# Patient Record
Sex: FEMALE | Race: WHITE | NOT HISPANIC OR LATINO | Employment: STUDENT | URBAN - METROPOLITAN AREA
[De-identification: names, ages, dates, MRNs, and addresses within clinical notes are randomized per-mention and may not be internally consistent; named-entity substitution may affect disease eponyms.]

---
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
## 2024-01-05 ENCOUNTER — PATIENT MESSAGE (OUTPATIENT)
Dept: OBSTETRICS AND GYNECOLOGY | Facility: CLINIC | Age: 19
End: 2024-01-05
Payer: OTHER GOVERNMENT

## 2024-01-05 NOTE — TELEPHONE ENCOUNTER
----- Message from Sheri Dorado sent at 1/5/2024  1:23 PM CST -----  Regarding: [id number]  Type: Patient Call Back    Who called:self    What is the request in detail:Pt states she will like to come in and consult for the Nexplanon birth control, will like a callback from the nurse to be scheduled.    Can the clinic reply by [facility]?no    Would the patient rather a call back or a response via [facility]? Callback     Best call back number: [phone]         Additional Information: pt is trying to get in as soon as possible

## 2024-01-12 ENCOUNTER — OFFICE VISIT (OUTPATIENT)
Dept: OBSTETRICS AND GYNECOLOGY | Facility: CLINIC | Age: 19
End: 2024-01-12
Payer: OTHER GOVERNMENT

## 2024-01-12 VITALS
DIASTOLIC BLOOD PRESSURE: 72 MMHG | BODY MASS INDEX: 25.68 KG/M2 | HEIGHT: 66 IN | SYSTOLIC BLOOD PRESSURE: 102 MMHG | WEIGHT: 159.81 LBS

## 2024-01-12 DIAGNOSIS — Z30.09 BIRTH CONTROL COUNSELING: Primary | ICD-10-CM

## 2024-01-12 PROCEDURE — 99213 OFFICE O/P EST LOW 20 MIN: CPT | Mod: PBBFAC,PN | Performed by: NURSE PRACTITIONER

## 2024-01-12 PROCEDURE — 99999 PR PBB SHADOW E&M-EST. PATIENT-LVL III: CPT | Mod: PBBFAC,,, | Performed by: NURSE PRACTITIONER

## 2024-01-12 PROCEDURE — 99203 OFFICE O/P NEW LOW 30 MIN: CPT | Mod: S$PBB,,, | Performed by: NURSE PRACTITIONER

## 2024-01-12 RX ORDER — VENLAFAXINE HYDROCHLORIDE 37.5 MG/1
37.5 CAPSULE, EXTENDED RELEASE ORAL
COMMUNITY
Start: 2024-01-10

## 2024-01-12 RX ORDER — MEDROXYPROGESTERONE ACETATE 150 MG/ML
150 INJECTION, SUSPENSION INTRAMUSCULAR
COMMUNITY
Start: 2023-10-30 | End: 2024-01-26

## 2024-01-12 RX ORDER — MIRTAZAPINE 15 MG/1
1 TABLET, FILM COATED ORAL NIGHTLY
COMMUNITY
Start: 2023-12-20

## 2024-01-12 NOTE — PROGRESS NOTES
CC: contraception     HPI: Pt is a 18 y.o.  female who presents to discuss change in contraception. She is currently using Depo Provera. She has used Nexplanon in the past and did well on it.   Last Depo in 10/23.  She would like to have the Nexplanon prior to her window for needing next injection. She prefers to avoid method that would increase risk of blood clots.   She has a itching rash to her left shoulder - appeared after recent covid infection.     ROS:  GENERAL: Feeling well overall. Denies fever or chills.   SKIN: Denies rash or lesions.   HEAD: Denies head injury or headache.   NODES: Denies enlarged lymph nodes.   CHEST: Denies chest pain or shortness of breath.   CARDIOVASCULAR: Denies palpitations or left sided chest pain.   ABDOMEN: No abdominal pain, constipation, diarrhea, nausea, vomiting or rectal bleeding.   URINARY: No dysuria, hematuria, or burning on urination.  REPRODUCTIVE: See HPI.   BREASTS: Denies pain, lumps, or nipple discharge.   HEMATOLOGIC: No easy bruisability or excessive bleeding.   MUSCULOSKELETAL: Denies joint pain or swelling.   NEUROLOGIC: Denies syncope or weakness.   PSYCHIATRIC: Denies depression, anxiety or mood swings.    PE:   APPEARANCE: Well nourished, well developed, White female in no acute distress.  PEVIS: Deferred       Diagnosis:  1. Birth control counseling        Plan:   Patient was counseled today on Nexplanon  Discussed risks/ benefits including potential for irregular bleeding pattern   Discussed otc hydrocortisone to rash if no resolution follow with derm for eval   Benefits investigation initiated for Nexplanon       Orders Placed This Encounter    Device Authorization Order    Device Authorization Order         Follow-up  for insertion or as needed     YOVANY Mena

## 2024-01-15 ENCOUNTER — OFFICE VISIT (OUTPATIENT)
Dept: URGENT CARE | Facility: CLINIC | Age: 19
End: 2024-01-15
Payer: OTHER GOVERNMENT

## 2024-01-15 VITALS
WEIGHT: 159 LBS | HEIGHT: 66 IN | BODY MASS INDEX: 25.55 KG/M2 | OXYGEN SATURATION: 96 % | DIASTOLIC BLOOD PRESSURE: 81 MMHG | SYSTOLIC BLOOD PRESSURE: 117 MMHG | RESPIRATION RATE: 18 BRPM | TEMPERATURE: 99 F | HEART RATE: 108 BPM

## 2024-01-15 DIAGNOSIS — N76.0 BACTERIAL VAGINOSIS: ICD-10-CM

## 2024-01-15 DIAGNOSIS — L29.9 PRURITUS: ICD-10-CM

## 2024-01-15 DIAGNOSIS — B96.89 BACTERIAL VAGINOSIS: ICD-10-CM

## 2024-01-15 DIAGNOSIS — R21 RASH AND NONSPECIFIC SKIN ERUPTION: ICD-10-CM

## 2024-01-15 DIAGNOSIS — R35.0 URINARY FREQUENCY: ICD-10-CM

## 2024-01-15 DIAGNOSIS — Z72.51 UNPROTECTED SEXUAL INTERCOURSE: Primary | ICD-10-CM

## 2024-01-15 DIAGNOSIS — B37.31 VAGINAL YEAST INFECTION: ICD-10-CM

## 2024-01-15 LAB
B-HCG UR QL: NEGATIVE
BILIRUB UR QL STRIP: NEGATIVE
CTP QC/QA: YES
GLUCOSE UR QL STRIP: NEGATIVE
HBV SURFACE AG SERPL QL IA: NORMAL
HCV AB SERPL QL IA: NORMAL
HIV 1+2 AB+HIV1 P24 AG SERPL QL IA: NORMAL
KETONES UR QL STRIP: NEGATIVE
LEUKOCYTE ESTERASE UR QL STRIP: NEGATIVE
PH, POC UA: 8 (ref 5–8)
POC BLOOD, URINE: POSITIVE
POC NITRATES, URINE: NEGATIVE
PROT UR QL STRIP: NEGATIVE
SP GR UR STRIP: 1.01 (ref 1–1.03)
UROBILINOGEN UR STRIP-ACNC: NORMAL (ref 0.1–1.1)

## 2024-01-15 PROCEDURE — 86592 SYPHILIS TEST NON-TREP QUAL: CPT

## 2024-01-15 PROCEDURE — 87491 CHLMYD TRACH DNA AMP PROBE: CPT

## 2024-01-15 PROCEDURE — 87389 HIV-1 AG W/HIV-1&-2 AB AG IA: CPT

## 2024-01-15 PROCEDURE — 81003 URINALYSIS AUTO W/O SCOPE: CPT | Mod: QW,S$GLB,,

## 2024-01-15 PROCEDURE — 86803 HEPATITIS C AB TEST: CPT

## 2024-01-15 PROCEDURE — 81514 NFCT DS BV&VAGINITIS DNA ALG: CPT

## 2024-01-15 PROCEDURE — 81025 URINE PREGNANCY TEST: CPT | Mod: S$GLB,,,

## 2024-01-15 PROCEDURE — 87340 HEPATITIS B SURFACE AG IA: CPT

## 2024-01-15 PROCEDURE — 99214 OFFICE O/P EST MOD 30 MIN: CPT | Mod: S$GLB,,,

## 2024-01-15 PROCEDURE — 36415 COLL VENOUS BLD VENIPUNCTURE: CPT

## 2024-01-15 RX ORDER — TRIAMCINOLONE ACETONIDE 1 MG/G
CREAM TOPICAL 2 TIMES DAILY
Qty: 15 G | Refills: 1 | Status: SHIPPED | OUTPATIENT
Start: 2024-01-15

## 2024-01-15 RX ORDER — PREDNISONE 20 MG/1
20 TABLET ORAL DAILY
Qty: 4 TABLET | Refills: 0 | Status: SHIPPED | OUTPATIENT
Start: 2024-01-15 | End: 2024-01-26

## 2024-01-15 NOTE — PROGRESS NOTES
"Subjective:      Patient ID: Alia Del Rosario is a 18 y.o. female.    Vitals:  height is 5' 6" (1.676 m) and weight is 72.1 kg (159 lb). Her oral temperature is 98.7 °F (37.1 °C). Her blood pressure is 117/81 and her pulse is 108. Her respiration is 18 and oxygen saturation is 96%.     Chief Complaint: Exposure to STD    This is a 18 y.o. female who presents today with a chief complaint of std testing.     Provider note:    19 yo F c/o UPI 3 weeks ago with new partner as well as an itchy rash that appeared following the incident. The rash is located to her neck and left arm, she has applied cortisone cream otc. Denies vaginal discharge, vaginal sores, itchiness, irritation. States she has been urinating more and having urgency. Denies dysuria, fever, nvd, flank pain. She uses Depo for birth control, denies being late for injection. Does not have menstrual cycles, states she spots. SHe presents with her mom today    Exposure to STD   The patient's primary symptoms include a genital rash. The patient's pertinent negatives include no dysuria or pelvic pain. This is a new problem. The current episode started 1 to 4 weeks ago. The vaginal discharge was normal. Associate symptoms include urinary frequency. She has tried nothing for the symptoms.       Genitourinary:  Positive for frequency and urgency. Negative for dysuria, urine decreased, flank pain, bladder incontinence, hematuria, history of kidney stones, irregular menstruation, missed menses, heavy menstrual bleeding, vaginal pain, vaginal discharge, vaginal bleeding, vaginal odor, genital sore and pelvic pain.   Skin:  Positive for rash and erythema.      Objective:     Physical Exam   Constitutional: normal  HENT:   Head: Normocephalic and atraumatic.   Eyes: Conjunctivae are normal. Pupils are equal, round, and reactive to light. Extraocular movement intact   Abdominal: Normal appearance.   Musculoskeletal: Normal range of motion.         General: Normal range of " motion.   Neurological: She is alert.   Skin: Skin is warm and dry. erythema         Comments: Erythematous Maculopapular rash noted to neck and left arm        Assessment:     1. Unprotected sexual intercourse    2. Urinary frequency    3. Rash and nonspecific skin eruption    4. Pruritus        Plan:   Discussed preventatively starting Gc/CT treatment today, mom and pt declined. Zyrtec and pepcid daily until symptoms resolve. Will follow up with STI testing. Avoid intercourse until labs result.     Unprotected sexual intercourse    Urinary frequency  -     POCT Urinalysis, Dipstick, Automated, W/O Scope  -     POCT urine pregnancy  -     C. trachomatis/N. gonorrhoeae by AMP DNA  -     Vaginosis Screen by DNA Probe  -     RPR  -     HIV 1/2 Ag/Ab (4th Gen)  -     Hepatitis C Antibody  -     Hepatitis B Surface Antigen    Rash and nonspecific skin eruption  -     predniSONE (DELTASONE) 20 MG tablet; Take 1 tablet (20 mg total) by mouth once daily. for 4 days  Dispense: 4 tablet; Refill: 0  -     triamcinolone acetonide 0.1% (KENALOG) 0.1 % cream; Apply topically 2 (two) times daily.  Dispense: 15 g; Refill: 1    Pruritus  -     predniSONE (DELTASONE) 20 MG tablet; Take 1 tablet (20 mg total) by mouth once daily. for 4 days  Dispense: 4 tablet; Refill: 0  -     triamcinolone acetonide 0.1% (KENALOG) 0.1 % cream; Apply topically 2 (two) times daily.  Dispense: 15 g; Refill: 1      Results for orders placed or performed in visit on 01/15/24   POCT Urinalysis, Dipstick, Automated, W/O Scope   Result Value Ref Range    POC Blood, Urine Positive (A) Negative, Positive Slide, Positive Tube    POC Bilirubin, Urine Negative Negative, Positive Slide, Positive Tube    POC Urobilinogen, Urine normal 0.1 - 1.1    POC Ketones, Urine Negative Negative, Positive Slide, Positive Tube    POC Protein, Urine Negative Negative, Positive Slide, Positive Tube    POC Nitrates, Urine Negative Negative, Positive Slide, Positive Tube    POC  Glucose, Urine Negative Negative, Positive Slide, Positive Tube    pH, UA 8.0 5 - 8    POC Specific Gravity, Urine 1.010 1.003 - 1.029    POC Leukocytes, Urine Negative Negative, Positive Slide, Positive Tube   POCT urine pregnancy   Result Value Ref Range    POC Preg Test, Ur Negative Negative     Acceptable Yes             Increase condom use to prevent further occurance.  Notify sexual partners of the need for testing.  Complete ALL medication prescribed.  NO sexual intercourse for 7 days after treatment. Retest to ensure infection has cleared-there are infections that require more agressive treatment.  Retest for all ini 6 weeks and again in 6 monts to ensure true negative results.  Today's testing will give no crediable information if you have unprotected sexual activities going forward. Syphillis cases are rising!  Gonorrhea has RESISTANT strains which is why repeat testing after treatment is important.

## 2024-01-15 NOTE — PATIENT INSTRUCTIONS
Zyrtec and pepcid daily until symptoms resolve. Will follow up with STI testing. Avoid intercourse until labs result.

## 2024-01-16 LAB
BACTERIAL VAGINOSIS DNA: POSITIVE
CANDIDA GLABRATA DNA: NEGATIVE
CANDIDA KRUSEI DNA: NEGATIVE
CANDIDA RRNA VAG QL PROBE: POSITIVE
RPR SER QL: NORMAL
T VAGINALIS RRNA GENITAL QL PROBE: NEGATIVE

## 2024-01-17 ENCOUNTER — TELEPHONE (OUTPATIENT)
Dept: URGENT CARE | Facility: CLINIC | Age: 19
End: 2024-01-17
Payer: OTHER GOVERNMENT

## 2024-01-17 LAB
C TRACH DNA SPEC QL NAA+PROBE: NOT DETECTED
N GONORRHOEA DNA SPEC QL NAA+PROBE: NOT DETECTED

## 2024-01-17 RX ORDER — METRONIDAZOLE 500 MG/1
500 TABLET ORAL EVERY 12 HOURS
Qty: 14 TABLET | Refills: 0 | Status: SHIPPED | OUTPATIENT
Start: 2024-01-17 | End: 2024-01-26

## 2024-01-17 RX ORDER — FLUCONAZOLE 150 MG/1
150 TABLET ORAL DAILY
Qty: 1 TABLET | Refills: 0 | Status: SHIPPED | OUTPATIENT
Start: 2024-01-17 | End: 2024-01-26

## 2024-01-17 NOTE — TELEPHONE ENCOUNTER
10:00 AM: Spoke with patient on the phone and discussed results of recent testing. Will send in Diflucan for yeast infection and flagyl for bacterial vaginosis. Flagyl and alcohol precautions discussed. Answered all questions.

## 2024-01-21 ENCOUNTER — TELEPHONE (OUTPATIENT)
Dept: URGENT CARE | Facility: CLINIC | Age: 19
End: 2024-01-21
Payer: OTHER GOVERNMENT

## 2024-01-21 ENCOUNTER — PATIENT MESSAGE (OUTPATIENT)
Dept: URGENT CARE | Facility: CLINIC | Age: 19
End: 2024-01-21
Payer: OTHER GOVERNMENT

## 2024-01-21 NOTE — TELEPHONE ENCOUNTER
----- Message from Raymundo Silva MA sent at 1/19/2024  9:34 AM CST -----  Contact: [phone]    ----- Message -----  From: Sherlyn Rojas  Sent: 1/19/2024   9:33 AM CST  To: #    Patient called for medical advice,  mom states pt is having Rx allergy reaction . requesting a call back.    Thank you

## 2024-01-21 NOTE — TELEPHONE ENCOUNTER
Message sent through portal for pt to call us or make appt.  If pt is short of breath or having swelling in face

## 2024-01-21 NOTE — TELEPHONE ENCOUNTER
----- Message from Raymundo Silva MA sent at 1/19/2024  9:34 AM CST -----  Contact: [phone]    ----- Message -----  From: Sherlyn Rojas  Sent: 1/19/2024   9:33 AM CST  To: #    Patient called for medical advice,  mom states pt is having Rx allergy reaction . requesting a call back.    Thank you

## 2024-01-21 NOTE — TELEPHONE ENCOUNTER
----- Message from Raymundo Silva MA sent at 1/19/2024  9:34 AM CST -----  Contact: [phone]    ----- Message -----  From: Sherlyn Rojas  Sent: 1/19/2024   9:33 AM CST  To: #    Patient called for medical advice,  mom states pt is having Rx allergy reaction . requesting a call back.    Thank you

## 2024-01-26 ENCOUNTER — PROCEDURE VISIT (OUTPATIENT)
Dept: OBSTETRICS AND GYNECOLOGY | Facility: CLINIC | Age: 19
End: 2024-01-26
Payer: OTHER GOVERNMENT

## 2024-01-26 VITALS
BODY MASS INDEX: 26.19 KG/M2 | WEIGHT: 162.94 LBS | DIASTOLIC BLOOD PRESSURE: 72 MMHG | HEIGHT: 66 IN | SYSTOLIC BLOOD PRESSURE: 110 MMHG

## 2024-01-26 DIAGNOSIS — Z30.017 NEXPLANON INSERTION: Primary | ICD-10-CM

## 2024-01-26 PROCEDURE — 11981 INSERTION DRUG DLVR IMPLANT: CPT | Mod: PBBFAC,PN | Performed by: NURSE PRACTITIONER

## 2024-01-26 PROCEDURE — 99499 UNLISTED E&M SERVICE: CPT | Mod: 25,S$PBB,, | Performed by: NURSE PRACTITIONER

## 2024-01-26 PROCEDURE — 11981 INSERTION DRUG DLVR IMPLANT: CPT | Mod: S$PBB,,, | Performed by: NURSE PRACTITIONER

## 2024-01-26 PROCEDURE — 99999PBSHW PR PBB SHADOW TECHNICAL ONLY FILED TO HB: Mod: PBBFAC,,,

## 2024-01-26 RX ADMIN — ETONOGESTREL 68 MG: 68 IMPLANT SUBCUTANEOUS at 12:01

## 2024-01-26 NOTE — PROCEDURES
CC: NEXPLANON INSERTION      PRE-NEXPLANON INSERTION COUNSELING:  All contraceptive options were reviewed and the patient chooses Nexplanon.  Patients history was reviewed and there were no contraindications to Nexplanon.  The procedure and minimal risks of pain, bleeding, bruising and infection at the insertion site discussed. Possible irregular menstrual bleeding pattern versus amenorrhea was explained.  No protection against STDs discussed.  Written information provided; all questions answered and patient agrees to proceed.  Consent signed and scanned into computer.  NEXPLANON LOT #[id number],  EXPIRATION 04/01/2025    There were no vitals filed for this visit.    EXAM:  With patient in supine position the nondominant arm was flexed at the elbow and externally rotated.  The insertion site was identified 6-8 cm above the elbow crease at the inner side of the upper arm overlying the groove between biceps and triceps.  The insertion site was marked and a second kate was placed 6-8 cm above the first.    PROCEDURE:  TIME OUT PERFORMED.  The insertion site was prepped with antiseptic and injected with 2 cc of 1% Xylocaine without epinephrine subq along the planned insertion canal.  Using sterile technique the Nexplanon applicator was visually verified and removed from the blister pack.  The Transparent Protection Cap was removed by sliding it horizontally in the direction of the arrow away from the needle.  The white colored implant was visualized by looking into the tip of the needle.   The needle tip was inserted bevel side up at a 30 degree angle to penetrate the skin.  The applicator was lowered parallel to the arm and the skin was tented with the needle.  While lifting skin with the needle, the needle was inserted to its full length.  Keeping the applicator in place, the purple slider was unlocked by pushing it slightly down.  The slider was then moved fully back until it stopped.  The applicator was then removed.  The Needle was noted to be fully retracted and only the purple tip of the obturator was visible.  The implant was palpable beneath the skin after insertion.  A small adhesive bandage and then a pressure bandage was placed over the insertion site.  The patient tolerated the procedure well.    ASSESSMENT:  1. Contraception management / Nexplanon insertion.V25.0.    POST NEXPLANON INSERTION COUNSELING:  Manage post nexplanon placement arm pain with NSAIDs, Tylenol or Rx per [employer].  Keep arm elevated and apply intermittent ice packs to decrease pain and bruising for 24 Hours.  May remove bandage in 24 hours.  Nexplanon danger signs (worsening pain at insertion site, bleeding through bandage, redness and/or pus drainage at insertion site).  Removal in 3 years.    Counseling lasted approximately 15 minutes and all her questions were answered.    FOLLOW-UP: with me in two weeks.

## 2024-02-08 ENCOUNTER — OFFICE VISIT (OUTPATIENT)
Dept: URGENT CARE | Facility: CLINIC | Age: 19
End: 2024-02-08
Payer: OTHER GOVERNMENT

## 2024-02-08 VITALS
SYSTOLIC BLOOD PRESSURE: 106 MMHG | HEIGHT: 66 IN | HEART RATE: 70 BPM | WEIGHT: 162 LBS | TEMPERATURE: 97 F | RESPIRATION RATE: 15 BRPM | OXYGEN SATURATION: 99 % | BODY MASS INDEX: 26.03 KG/M2 | DIASTOLIC BLOOD PRESSURE: 72 MMHG

## 2024-02-08 DIAGNOSIS — J01.90 ACUTE BACTERIAL RHINOSINUSITIS: Primary | ICD-10-CM

## 2024-02-08 DIAGNOSIS — B96.89 ACUTE BACTERIAL RHINOSINUSITIS: Primary | ICD-10-CM

## 2024-02-08 DIAGNOSIS — R09.81 SINUS CONGESTION: ICD-10-CM

## 2024-02-08 DIAGNOSIS — R05.1 ACUTE COUGH: ICD-10-CM

## 2024-02-08 LAB
CTP QC/QA: YES
CTP QC/QA: YES
POC MOLECULAR INFLUENZA A AGN: NEGATIVE
POC MOLECULAR INFLUENZA B AGN: NEGATIVE
SARS-COV-2 AG RESP QL IA.RAPID: NEGATIVE

## 2024-02-08 PROCEDURE — 87502 INFLUENZA DNA AMP PROBE: CPT | Mod: QW,S$GLB,,

## 2024-02-08 PROCEDURE — 94640 AIRWAY INHALATION TREATMENT: CPT | Mod: S$GLB,,,

## 2024-02-08 PROCEDURE — 87811 SARS-COV-2 COVID19 W/OPTIC: CPT | Mod: QW,S$GLB,,

## 2024-02-08 PROCEDURE — 99213 OFFICE O/P EST LOW 20 MIN: CPT | Mod: 25,S$GLB,,

## 2024-02-08 RX ORDER — IPRATROPIUM BROMIDE 0.5 MG/2.5ML
0.5 SOLUTION RESPIRATORY (INHALATION)
Status: COMPLETED | OUTPATIENT
Start: 2024-02-08 | End: 2024-02-08

## 2024-02-08 RX ORDER — ALBUTEROL SULFATE 0.83 MG/ML
2.5 SOLUTION RESPIRATORY (INHALATION)
Status: COMPLETED | OUTPATIENT
Start: 2024-02-08 | End: 2024-02-08

## 2024-02-08 RX ORDER — ESCITALOPRAM OXALATE 5 MG/1
5 TABLET ORAL
COMMUNITY
Start: 2024-01-27 | End: 2024-06-07 | Stop reason: ALTCHOICE

## 2024-02-08 RX ORDER — AMOXICILLIN AND CLAVULANATE POTASSIUM 875; 125 MG/1; MG/1
1 TABLET, FILM COATED ORAL EVERY 12 HOURS
Qty: 10 TABLET | Refills: 0 | Status: SHIPPED | OUTPATIENT
Start: 2024-02-08 | End: 2024-02-13

## 2024-02-08 RX ORDER — ALBUTEROL SULFATE 90 UG/1
2 AEROSOL, METERED RESPIRATORY (INHALATION) EVERY 6 HOURS PRN
Qty: 18 G | Refills: 0 | Status: SHIPPED | OUTPATIENT
Start: 2024-02-08 | End: 2024-02-08 | Stop reason: RX

## 2024-02-08 RX ORDER — ALBUTEROL SULFATE 90 UG/1
2 AEROSOL, METERED RESPIRATORY (INHALATION) EVERY 6 HOURS PRN
Qty: 18 G | Refills: 0 | Status: SHIPPED | OUTPATIENT
Start: 2024-02-08

## 2024-02-08 RX ADMIN — IPRATROPIUM BROMIDE 0.5 MG: 0.5 SOLUTION RESPIRATORY (INHALATION) at 05:02

## 2024-02-08 RX ADMIN — ALBUTEROL SULFATE 2.5 MG: 0.83 SOLUTION RESPIRATORY (INHALATION) at 04:02

## 2024-02-08 NOTE — PROGRESS NOTES
Subjective:      Patient ID: Alia Del Rosario is a 18 y.o. female.    Vitals:  vitals were not taken for this visit.     Chief Complaint: No chief complaint on file.    HPI  ROS   Objective:     Physical Exam    Assessment:     No diagnosis found.    Plan:       There are no diagnoses linked to this encounter.

## 2024-02-08 NOTE — PROGRESS NOTES
"Subjective:      Patient ID: Alia Del Rosario is a 18 y.o. female.    Vitals:  height is 5' 6" (1.676 m) and weight is 73.5 kg (162 lb). Her oral temperature is 97.4 °F (36.3 °C). Her blood pressure is 106/72 and her pulse is 70. Her respiration is 15 and oxygen saturation is 99%.     Chief Complaint: No chief complaint on file.    18-year-old female patient presents with green productive cough for over a week, with postnasal drip, sinus pain, sinus pressure, chest congestion for over a week.  Patient states she is been taking Mucinex and her albuterol inhaler daily with no relief. Patient denies fever, SOB, CP, GI complaints or any other associated symptoms.      Cough  This is a new problem. The current episode started in the past 7 days (02/01/2024). Associated symptoms include nasal congestion, postnasal drip and rhinorrhea. Pertinent negatives include no chest pain, chills, ear pain, eye redness, fever, headaches, hemoptysis, rash, sore throat, shortness of breath, sweats, weight loss or wheezing. Treatments tried: dayquil. The treatment provided mild relief. There is no history of environmental allergies.       Constitution: Negative for activity change, appetite change, chills, sweating, fatigue and fever.   HENT:  Positive for postnasal drip, sinus pain and sinus pressure. Negative for ear pain, ear discharge, foreign body in ear, tinnitus, hearing loss, dental problem, congestion, nosebleeds, foreign body in nose and sore throat.    Neck: Negative for neck pain, neck stiffness, painful lymph nodes and neck swelling.   Cardiovascular:  Negative for chest trauma, chest pain, leg swelling and palpitations.   Eyes:  Negative for eye trauma, foreign body in eye, eye discharge, eye itching, eye pain and eye redness.   Respiratory:  Positive for cough and sputum production. Negative for sleep apnea, chest tightness, bloody sputum, COPD, shortness of breath, stridor, wheezing and asthma.    Gastrointestinal:  " Negative for abdominal trauma, abdominal pain, abdominal bloating and history of abdominal surgery.   Endocrine: hair loss, cold intolerance and heat intolerance.   Genitourinary:  Negative for dysuria, frequency, urgency, urine decreased, flank pain, bladder incontinence, bed wetting and hematuria.   Musculoskeletal:  Negative for pain, trauma, joint pain, joint swelling and abnormal ROM of joint.   Skin:  Negative for color change, pale, rash, wound, abrasion, laceration, lesion and hives.   Allergic/Immunologic: Positive for recurrent sinus infections. Negative for environmental allergies, seasonal allergies, food allergies, eczema, asthma, immunocompromised state, chronic cough, hives, itching, sneezing and immunizations up-to-date.   Neurological:  Negative for dizziness, history of vertigo, light-headedness, passing out, headaches, disorientation and altered mental status.   Hematologic/Lymphatic: Negative for swollen lymph nodes, easy bruising/bleeding, trouble clotting and history of blood clots. Does not bruise/bleed easily.   Psychiatric/Behavioral:  Negative for altered mental status, disorientation, confusion, agitation and nervous/anxious. The patient is not nervous/anxious.       Objective:     Physical Exam   Constitutional: She is oriented to person, place, and time. She appears well-developed. She is cooperative.  Non-toxic appearance. She does not appear ill. No distress.   HENT:   Head: Normocephalic and atraumatic.   Ears:   Right Ear: Hearing, tympanic membrane, external ear and ear canal normal.   Left Ear: Hearing, tympanic membrane, external ear and ear canal normal.   Nose: Congestion present. No mucosal edema, rhinorrhea or nasal deformity. No epistaxis. Right sinus exhibits no maxillary sinus tenderness and no frontal sinus tenderness. Left sinus exhibits no maxillary sinus tenderness and no frontal sinus tenderness.   Mouth/Throat: Uvula is midline, oropharynx is clear and moist and  mucous membranes are normal. No trismus in the jaw. Normal dentition. No uvula swelling. No oropharyngeal exudate, posterior oropharyngeal edema or posterior oropharyngeal erythema.   Eyes: Conjunctivae and lids are normal. No scleral icterus.   Neck: Trachea normal and phonation normal. Neck supple. No edema present. No erythema present. No neck rigidity present.   Cardiovascular: Normal rate, regular rhythm, normal heart sounds and normal pulses.   Pulmonary/Chest: Effort normal. No stridor. No respiratory distress. She has no decreased breath sounds. She has no wheezes. She has rhonchi in the right upper field and the left upper field. She has no rales. She exhibits no tenderness.   Abdominal: Normal appearance and bowel sounds are normal. She exhibits no distension and no mass. Soft. There is no abdominal tenderness. There is no rebound, no guarding, no left CVA tenderness and no right CVA tenderness. No hernia.   Musculoskeletal: Normal range of motion.         General: No deformity. Normal range of motion.   Neurological: She is alert and oriented to person, place, and time. She exhibits normal muscle tone. Coordination normal.   Skin: Skin is warm, dry, intact, not diaphoretic and not pale.   Psychiatric: Her speech is normal and behavior is normal. Judgment and thought content normal.   Nursing note and vitals reviewed.    Results for orders placed or performed in visit on 02/08/24   SARS Coronavirus 2 Antigen, POCT Manual Read   Result Value Ref Range    SARS Coronavirus 2 Antigen Negative Negative     Acceptable Yes    POCT Influenza A/B MOLECULAR   Result Value Ref Range    POC Molecular Influenza A Ag Negative Negative, Not Reported    POC Molecular Influenza B Ag Negative Negative, Not Reported     Acceptable Yes       Assessment:     1. Acute bacterial rhinosinusitis    2. Sinus congestion    3. Acute cough        Plan:       Acute bacterial rhinosinusitis  -      amoxicillin-clavulanate 875-125mg (AUGMENTIN) 875-125 mg per tablet; Take 1 tablet by mouth every 12 (twelve) hours. for 5 days  Dispense: 10 tablet; Refill: 0    Sinus congestion  -     SARS Coronavirus 2 Antigen, POCT Manual Read  -     POCT Influenza A/B MOLECULAR    Acute cough  -     albuterol nebulizer solution 2.5 mg  -     ipratropium 0.02 % nebulizer solution 0.5 mg  -     Discontinue: albuterol (PROVENTIL HFA) 90 mcg/actuation inhaler; Inhale 2 puffs into the lungs every 6 (six) hours as needed for Wheezing. Rescue  Dispense: 18 g; Refill: 0  -     albuterol (VENTOLIN HFA) 90 mcg/actuation inhaler; Inhale 2 puffs into the lungs every 6 (six) hours as needed for Wheezing. Rescue  Dispense: 18 g; Refill: 0          Medical Decision Making:   Urgent Care Management:  Rhonchi breath sounds resolved after DuoNeb tx.     Additional MDM:     Heart Failure Score:   COPD = No

## 2024-02-08 NOTE — PATIENT INSTRUCTIONS
Continue Mucinex as needed for cough.  Take Augmentin twice a day for 5 days.  Continue albuterol inhaler twice a day as needed for chest tightness, wheezing, or coughing.    What care is needed at home?   Call your regular doctor to let them know you were in the ED. Make a follow-up appointment if you were told to.  To help you feel better:  Use a cool mist humidifier to avoid breathing dry air.  Use hard candy or cough drops to soothe sore throat and cough.  Gargle with salt water (mix 1/2 teaspoon salt with 1 cup warm water) a few times a day.  Spray saltwater mist in each nostril. Any normal saline spray works.  Sip warm liquids to keep your throat moist.  Take warm, steamy showers to help soothe the cough.  Do not smoke or be in smoke-filled places. Avoid things that may cause breathing problems like vaping, fumes, pollution, dust, and other common allergens.  You may want to use over-the-counter medicines for allergies or acid reflux if your cough is due to one of these problems.  You can also use an over-the-counter cough medicine.  When do I need to get emergency help?   Return to the ED if:   You have chest pain when you cough or trouble breathing.  You start to cough up blood or yellow or green mucus.  When do I need to call the doctor?   You have a fever of 100.4°F (38°C) or higher or chills.  You cough so hard you throw up.  You are still coughing in 10 days.  You have new or worsening symptoms.  - Rest.    - Drink plenty of fluids.    - Acetaminophen (tylenol) or Ibuprofen (advil,motrin) as directed as needed for fever/pain. Avoid tylenol if you have a history of liver disease. Do not take ibuprofen if you have a history of GI bleeding, kidney disease, or if you take blood thinners.     - Follow up with your PCP or specialty clinic as directed in the next 1-2 weeks if not improved or as needed.  You can call [phone] to schedule an appointment with the appropriate provider.    - Go to the ER or  seek medical attention immediately if you develop new or worsening symptoms.     - You must understand that you have received an Urgent Care treatment only and that you may be released before all of your medical problems are known or treated.   - You, the patient, will arrange for follow up care as instructed.   - If your condition worsens or fails to improve we recommend that you receive another evaluation at the ER immediately or contact your PCP to discuss your concerns or return here.

## 2024-02-26 ENCOUNTER — TELEPHONE (OUTPATIENT)
Dept: PSYCHIATRY | Facility: CLINIC | Age: 19
End: 2024-02-26
Payer: OTHER GOVERNMENT

## 2024-02-26 NOTE — TELEPHONE ENCOUNTER
I called Ms. Del Rosario in an attempt to reschedule her appointment with Dr. Luu. Dr. Luu only sees internal referrals for specific treatments, and we want to offer Ms. Del Rosario an appointment with another available provider. Her mother answered the primary phone number listed, and I requested a return call to my direct office number.     Ms. Del Rosario returned my call and provided a callback # of [phone]. She is seeking treatment of anxiety and trauma. She will be scheduled for an intake with me on 02/05/2024.

## 2024-03-05 ENCOUNTER — OFFICE VISIT (OUTPATIENT)
Dept: PSYCHIATRY | Facility: CLINIC | Age: 19
End: 2024-03-05
Payer: OTHER GOVERNMENT

## 2024-03-05 DIAGNOSIS — F41.0 PANIC DISORDER: Primary | ICD-10-CM

## 2024-03-05 DIAGNOSIS — F33.0 MAJOR DEPRESSIVE DISORDER, RECURRENT, MILD: ICD-10-CM

## 2024-03-05 PROBLEM — F98.9 UNSPECIFIED BEHAVIORAL AND EMOTIONAL DISORDERS WITH ONSET USUALLY OCCURRING IN CHILDHOOD AND ADOLESCENCE: Status: RESOLVED | Noted: 2021-09-09 | Resolved: 2024-03-05

## 2024-03-05 PROCEDURE — 90791 PSYCH DIAGNOSTIC EVALUATION: CPT | Mod: ,,, | Performed by: PSYCHOLOGIST
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
## 2024-03-11 NOTE — PROGRESS NOTES
[facility]  Individual Psychotherapy (PhD/LCSW)    3/12/2024    Site:  [facility]         Therapeutic Intervention: Met with patient.  Outpatient - Interactive psychotherapy 60 min - CPT code 63728    Chief complaint/reason for encounter: anxiety and panic     Interval history and content of current session: Ms. Alia Del Rosario arrived on time for her scheduled appointment.    CBT for Panic Attacks, Session #1  Session Content:  PDSS Score:  15  Introduction to CBT  Panic Disorder  Unhelpful Ways of Coping  Monitoring Panic and Anxiety  Dive Response    Practice Assignments:  Review treatment materials as needed  Record panic attacks on the Panic Attack Record  Record anxiety and mood on the Daily Mood Record  Record progress on the Progress Record  Dive Response    Treatment plan:  Target symptoms: anxiety , panic  Why chosen therapy is appropriate versus another modality: relevant to diagnosis, evidence based practice  Outcome monitoring methods: self-report, checklist/rating scale  Therapeutic intervention type: insight oriented psychotherapy, behavior modifying psychotherapy    Risk parameters:  Patient reports no suicidal ideation  Patient reports no homicidal ideation  Patient reports no self-injurious behavior  Patient reports no violent behavior    Verbal deficits: None    Patient's response to intervention:  The patient's response to intervention is accepting.    Progress toward goals and other mental status changes:  The patient's progress toward goals is fair .    Diagnosis:     ICD-10-CM ICD-9-CM   1. Panic disorder  F41.0 300.01       Plan:  individual psychotherapy    Return to clinic: 1 week    Length of Service (minutes): 60

## 2024-03-12 ENCOUNTER — OFFICE VISIT (OUTPATIENT)
Dept: PSYCHIATRY | Facility: CLINIC | Age: 19
End: 2024-03-12
Payer: OTHER GOVERNMENT

## 2024-03-12 DIAGNOSIS — F41.0 PANIC DISORDER: Primary | ICD-10-CM

## 2024-03-12 PROCEDURE — 90837 PSYTX W PT 60 MINUTES: CPT | Mod: ,,, | Performed by: PSYCHOLOGIST

## 2024-03-18 NOTE — PROGRESS NOTES
[facility]  Individual Psychotherapy (PhD/LCSW)    3/19/2024    Site:  [facility]         Therapeutic Intervention: Met with patient.  Outpatient - Interactive psychotherapy 60 min - CPT code 16571    Chief complaint/reason for encounter: anxiety and panic     Interval history and content of current session: Ms. Alia Del Rosario arrived on time for her scheduled appointment.      CBT for Panic Attacks, Session #2  Session Content:  PDSS Score:  17  Progress Record:       # of panic attacks:  1       Average anxiety:  8       Average worry about panic:  6  Purpose and Parts of Anxiety, Panic, and Agoraphobia  Introduction to Breathing Skills    Practice Assignments:  Review treatment materials as needed  Record panic attacks on the Panic Attack Record  Record anxiety and mood on the Daily Mood Record  Record progress on the Progress Record  Practice Breathing Skills 2x/day for 10 minutes each    Treatment plan:  Target symptoms: anxiety , panic  Why chosen therapy is appropriate versus another modality: relevant to diagnosis, evidence based practice  Outcome monitoring methods: self-report, checklist/rating scale  Therapeutic intervention type: insight oriented psychotherapy, behavior modifying psychotherapy    Risk parameters:  Patient reports no suicidal ideation  Patient reports no homicidal ideation  Patient reports no self-injurious behavior  Patient reports no violent behavior    Verbal deficits: None    Patient's response to intervention:  The patient's response to intervention is accepting.    Progress toward goals and other mental status changes:  The patient's progress toward goals is fair .    Diagnosis:     ICD-10-CM ICD-9-CM   1. Panic disorder  F41.0 300.01       Plan:  individual psychotherapy    Return to clinic: 1 week    Length of Service (minutes): 60

## 2024-03-19 ENCOUNTER — OFFICE VISIT (OUTPATIENT)
Dept: PSYCHIATRY | Facility: CLINIC | Age: 19
End: 2024-03-19
Payer: OTHER GOVERNMENT

## 2024-03-19 DIAGNOSIS — F41.0 PANIC DISORDER: Primary | ICD-10-CM

## 2024-03-19 PROCEDURE — 90837 PSYTX W PT 60 MINUTES: CPT | Mod: ,,, | Performed by: PSYCHOLOGIST

## 2024-03-26 ENCOUNTER — OFFICE VISIT (OUTPATIENT)
Dept: PSYCHIATRY | Facility: CLINIC | Age: 19
End: 2024-03-26
Payer: OTHER GOVERNMENT

## 2024-03-26 DIAGNOSIS — F41.0 PANIC DISORDER: Primary | ICD-10-CM

## 2024-03-26 PROCEDURE — 90834 PSYTX W PT 45 MINUTES: CPT | Mod: 95,,, | Performed by: PSYCHOLOGIST

## 2024-03-26 NOTE — PROGRESS NOTES
Virtual Visit  The [place] ([place])  The chief complaint leading to consultation is: [place])    Visit type: audio only    Face to Face time with patient: N/A  45 minutes of total time spent on the encounter, which includes face to face time and non-face to face time preparing to see the patient (eg, review of tests), Obtaining and/or reviewing separately obtained history, Documenting clinical information in the electronic or other health record, Independently interpreting results (not separately reported) and communicating results to the patient/family/caregiver, or Care coordination (not separately reported).     Each patient to whom he or she provides medical services by telemedicine is:  (1) informed of the relationship between the physician and patient and the respective role of any other health care provider with respect to management of the patient; and (2) notified that he or she may decline to receive medical services by telemedicine and may withdraw from such care at any time.    Notes:  N/A        [facility]  Individual Psychotherapy (PhD/LCSW)    3/26/2024    Site:  [facility]         Therapeutic Intervention: Met with patient.  Outpatient - Interactive psychotherapy 45 min - CPT code 02366    Chief complaint/reason for encounter: anxiety and panic     Interval history and content of current session: Ms. Alia Del Rosario arrived on time for her scheduled appointment.    CBT for Panic Attacks, Session #3  Session Content:  PDSS Score:  unable to assess  Progress Record:       # of panic attacks:  2       Average anxiety:  8       Average worry about panic:  6  Review Breathing Skills  Physical symptoms of Panic  Paced Breathing    Practice Assignments:  Review treatment materials as needed  Record panic attacks on the Panic Attack Record  Record anxiety and mood on the Daily Mood Record  Record progress on the Progress Record  Practice Breathing Skills 2x/day for 10 minutes  each    Treatment plan:  Target symptoms: anxiety , panic  Why chosen therapy is appropriate versus another modality: relevant to diagnosis, evidence based practice  Outcome monitoring methods: self-report, checklist/rating scale  Therapeutic intervention type: insight oriented psychotherapy, behavior modifying psychotherapy    Risk parameters:  Patient reports no suicidal ideation  Patient reports no homicidal ideation  Patient reports no self-injurious behavior  Patient reports no violent behavior    Verbal deficits: None    Patient's response to intervention:  The patient's response to intervention is accepting.    Progress toward goals and other mental status changes:  The patient's progress toward goals is fair .    Diagnosis:     ICD-10-CM ICD-9-CM   1. Panic disorder  F41.0 300.01       Plan:  individual psychotherapy    Return to clinic: 1 week    Length of Service (minutes): 45

## 2024-04-01 NOTE — PROGRESS NOTES
Virtual Visit  The [place] ([place]  The chief complaint leading to consultation is: [place]    Visit type: audiovisual    Face to Face time with patient: 49 minutes  50 minutes of total time spent on the encounter, which includes face to face time and non-face to face time preparing to see the patient (eg, review of tests), Obtaining and/or reviewing separately obtained history, Documenting clinical information in the electronic or other health record, Independently interpreting results (not separately reported) and communicating results to the patient/family/caregiver, or Care coordination (not separately reported).     Each patient to whom he or she provides medical services by telemedicine is:  (1) informed of the relationship between the physician and patient and the respective role of any other health care provider with respect to management of the patient; and (2) notified that he or she may decline to receive medical services by telemedicine and may withdraw from such care at any time.    Notes:  N/A        [facility]  Individual Psychotherapy (PhD/LCSW)    4/2/2024    Site:  [facility]         Therapeutic Intervention: Met with patient.  Outpatient - Interactive psychotherapy 45 min - CPT code 53527    Chief complaint/reason for encounter: anxiety and panic     Interval history and content of current session: Ms. Alia Del Rosario arrived on time for her scheduled appointment.    CBT for Panic Attacks, Session #4  Session Content:  PDSS Score:  14  Progress Record:       # of panic attacks:  2       Average anxiety:  6       Average worry about panic:  5  Review Breathing Skills  Helpful Thinking - Challenging Thoughts    Practice Assignments:  Review treatment materials as needed  Record panic attacks on the Panic Attack Record  Record anxiety and mood on the Daily Mood Record  Record progress on the Progress Record  Practice Breathing Skills 2x/day for 10 minutes each  Practice  Challenging Your Thoughts once each day    Treatment plan:  Target symptoms: anxiety , panic  Why chosen therapy is appropriate versus another modality: relevant to diagnosis, evidence based practice  Outcome monitoring methods: self-report, checklist/rating scale  Therapeutic intervention type: insight oriented psychotherapy, behavior modifying psychotherapy    Risk parameters:  Patient reports no suicidal ideation  Patient reports no homicidal ideation  Patient reports no self-injurious behavior  Patient reports no violent behavior    Verbal deficits: None    Patient's response to intervention:  The patient's response to intervention is accepting.    Progress toward goals and other mental status changes:  The patient's progress toward goals is fair .    Diagnosis:     ICD-10-CM ICD-9-CM   1. Panic disorder  F41.0 300.01       Plan:  individual psychotherapy    Return to clinic: 1 week    Length of Service (minutes): 45

## 2024-04-02 ENCOUNTER — OFFICE VISIT (OUTPATIENT)
Dept: PSYCHIATRY | Facility: CLINIC | Age: 19
End: 2024-04-02
Payer: OTHER GOVERNMENT

## 2024-04-02 DIAGNOSIS — F41.0 PANIC DISORDER: Primary | ICD-10-CM

## 2024-04-02 PROCEDURE — 90834 PSYTX W PT 45 MINUTES: CPT | Mod: 95,,, | Performed by: PSYCHOLOGIST

## 2024-04-08 ENCOUNTER — OFFICE VISIT (OUTPATIENT)
Dept: URGENT CARE | Facility: CLINIC | Age: 19
End: 2024-04-08
Payer: OTHER GOVERNMENT

## 2024-04-08 VITALS
TEMPERATURE: 99 F | SYSTOLIC BLOOD PRESSURE: 114 MMHG | DIASTOLIC BLOOD PRESSURE: 77 MMHG | WEIGHT: 162 LBS | HEART RATE: 83 BPM | RESPIRATION RATE: 16 BRPM | HEIGHT: 66 IN | BODY MASS INDEX: 26.03 KG/M2 | OXYGEN SATURATION: 98 %

## 2024-04-08 DIAGNOSIS — N30.00 ACUTE CYSTITIS WITHOUT HEMATURIA: Primary | ICD-10-CM

## 2024-04-08 DIAGNOSIS — R30.0 DYSURIA: ICD-10-CM

## 2024-04-08 LAB
B-HCG UR QL: NEGATIVE
BILIRUB UR QL STRIP: NEGATIVE
CTP QC/QA: YES
GLUCOSE UR QL STRIP: NEGATIVE
KETONES UR QL STRIP: NEGATIVE
LEUKOCYTE ESTERASE UR QL STRIP: POSITIVE
PH, POC UA: 6.5 (ref 5–8)
POC BLOOD, URINE: POSITIVE
POC NITRATES, URINE: NEGATIVE
PROT UR QL STRIP: POSITIVE
SP GR UR STRIP: 1.02 (ref 1–1.03)
UROBILINOGEN UR STRIP-ACNC: NORMAL (ref 0.1–1.1)

## 2024-04-08 PROCEDURE — 81025 URINE PREGNANCY TEST: CPT | Mod: S$GLB,,, | Performed by: SURGERY

## 2024-04-08 PROCEDURE — 99213 OFFICE O/P EST LOW 20 MIN: CPT | Mod: S$GLB,,, | Performed by: SURGERY

## 2024-04-08 PROCEDURE — 81003 URINALYSIS AUTO W/O SCOPE: CPT | Mod: QW,S$GLB,, | Performed by: SURGERY

## 2024-04-08 RX ORDER — NITROFURANTOIN 25; 75 MG/1; MG/1
100 CAPSULE ORAL 2 TIMES DAILY
Qty: 10 CAPSULE | Refills: 0 | Status: SHIPPED | OUTPATIENT
Start: 2024-04-08 | End: 2024-04-13

## 2024-04-08 RX ORDER — PHENAZOPYRIDINE HYDROCHLORIDE 200 MG/1
200 TABLET, FILM COATED ORAL 3 TIMES DAILY PRN
Qty: 6 TABLET | Refills: 0 | Status: SHIPPED | OUTPATIENT
Start: 2024-04-08 | End: 2024-04-10

## 2024-04-08 RX ORDER — PHENAZOPYRIDINE HYDROCHLORIDE 200 MG/1
200 TABLET, FILM COATED ORAL 3 TIMES DAILY PRN
Qty: 6 TABLET | Refills: 0 | Status: SHIPPED | OUTPATIENT
Start: 2024-04-08 | End: 2024-04-08 | Stop reason: SDUPTHER

## 2024-04-08 RX ORDER — NITROFURANTOIN 25; 75 MG/1; MG/1
100 CAPSULE ORAL 2 TIMES DAILY
Qty: 10 CAPSULE | Refills: 0 | Status: SHIPPED | OUTPATIENT
Start: 2024-04-08 | End: 2024-04-08 | Stop reason: SDUPTHER

## 2024-04-08 NOTE — PROGRESS NOTES
"Subjective:      Patient ID: Alia Del Rosario is a 18 y.o. female.    Vitals:  height is 5' 6" (1.676 m) and weight is 73.5 kg (162 lb). Her oral temperature is 98.9 °F (37.2 °C). Her blood pressure is 114/77 and her pulse is 83. Her respiration is 16 and oxygen saturation is 98%.     Chief Complaint: Dysuria    This is a 18 y.o. female who presents today with a chief complaint of urinary urgency and frequency and burning sensation when she urinates and when she doesn't that started this morning.       Dysuria   This is a new problem. The current episode started today. The problem occurs every urination. The quality of the pain is described as burning. The pain is at a severity of 7/10. The patient is experiencing no pain. There has been no fever. She is Sexually active. There is No history of pyelonephritis. Associated symptoms include frequency and urgency. Pertinent negatives include no behavior changes, chills, discharge, flank pain, hematuria, hesitancy, nausea, possible pregnancy, sweats, vomiting, weight loss, bubble bath use, constipation, rash or withholding. Associated symptoms comments: Positive for pelvic pain . She has tried nothing for the symptoms. The treatment provided no relief.     Constitution: Negative for chills.   Gastrointestinal:  Negative for nausea, vomiting and constipation.   Genitourinary:  Positive for dysuria, frequency and urgency. Negative for flank pain and hematuria.   Skin:  Negative for rash.      Objective:     Physical Exam   Constitutional: She is oriented to person, place, and time. She appears well-developed.   HENT:   Head: Normocephalic and atraumatic.   Ears:   Right Ear: External ear normal.   Left Ear: External ear normal.   Nose: Nose normal.   Mouth/Throat: Mucous membranes are normal.   Eyes: Conjunctivae and lids are normal.   Neck: Trachea normal. Neck supple.   Cardiovascular: Normal rate, regular rhythm and normal heart sounds.   Pulmonary/Chest: Effort normal " and breath sounds normal. No respiratory distress.   Abdominal: Normal appearance and bowel sounds are normal. She exhibits no distension and no mass. Soft. There is no abdominal tenderness.   Musculoskeletal: Normal range of motion.         General: Normal range of motion.   Neurological: She is alert and oriented to person, place, and time. She has normal strength.   Skin: Skin is warm, dry, intact, not diaphoretic and not pale.   Psychiatric: Her speech is normal and behavior is normal. Judgment and thought content normal.   Nursing note and vitals reviewed.      Assessment:     1. Acute cystitis without hematuria    2. Dysuria        Plan:       Acute cystitis without hematuria  -     Discontinue: nitrofurantoin, macrocrystal-monohydrate, (MACROBID) 100 MG capsule; Take 1 capsule (100 mg total) by mouth 2 (two) times daily. for 5 days  Dispense: 10 capsule; Refill: 0  -     Discontinue: phenazopyridine (PYRIDIUM) 200 MG tablet; Take 1 tablet (200 mg total) by mouth 3 (three) times daily as needed for Pain.  Dispense: 6 tablet; Refill: 0  -     nitrofurantoin, macrocrystal-monohydrate, (MACROBID) 100 MG capsule; Take 1 capsule (100 mg total) by mouth 2 (two) times daily. for 5 days  Dispense: 10 capsule; Refill: 0  -     phenazopyridine (PYRIDIUM) 200 MG tablet; Take 1 tablet (200 mg total) by mouth 3 (three) times daily as needed for Pain.  Dispense: 6 tablet; Refill: 0    Dysuria  -     POCT Urinalysis, Dipstick, Automated, W/O Scope  -     POCT urine pregnancy        Results for orders placed or performed in visit on 04/08/24   POCT Urinalysis, Dipstick, Automated, W/O Scope   Result Value Ref Range    POC Blood, Urine Positive (A) Negative    POC Bilirubin, Urine Negative Negative    POC Urobilinogen, Urine normal 0.1 - 1.1    POC Ketones, Urine Negative Negative    POC Protein, Urine Positive (A) Negative    POC Nitrates, Urine Negative Negative    POC Glucose, Urine Negative Negative    pH, UA 6.5 5 - 8     POC Specific Gravity, Urine 1.020 1.003 - 1.029    POC Leukocytes, Urine Positive (A) Negative   POCT urine pregnancy   Result Value Ref Range    POC Preg Test, Ur Negative Negative     Acceptable Yes

## 2024-04-09 ENCOUNTER — TELEPHONE (OUTPATIENT)
Dept: URGENT CARE | Facility: CLINIC | Age: 19
End: 2024-04-09
Payer: OTHER GOVERNMENT

## 2024-04-09 ENCOUNTER — TELEPHONE (OUTPATIENT)
Dept: PSYCHIATRY | Facility: CLINIC | Age: 19
End: 2024-04-09
Payer: OTHER GOVERNMENT

## 2024-04-09 ENCOUNTER — PATIENT MESSAGE (OUTPATIENT)
Dept: PSYCHIATRY | Facility: CLINIC | Age: 19
End: 2024-04-09
Payer: OTHER GOVERNMENT

## 2024-04-09 NOTE — TELEPHONE ENCOUNTER
S/w pt - states medication was resolved. She went to [facility] and the script wasn't sent, but they waited awhile and received it.   Medication: Nirtofurantoin.

## 2024-04-09 NOTE — TELEPHONE ENCOUNTER
Ms. Del Rosario left a voicemail indicating she will discontinue CBT for Panic protocol at this time in lieu of another treatment. I sent her a message and cancelled her subsequent appointments.

## 2024-05-05 ENCOUNTER — HOSPITAL ENCOUNTER (EMERGENCY)
Facility: HOSPITAL | Age: 19
Discharge: HOME | End: 2024-05-05
Attending: EMERGENCY MEDICINE
Payer: OTHER GOVERNMENT

## 2024-05-05 VITALS
DIASTOLIC BLOOD PRESSURE: 69 MMHG | OXYGEN SATURATION: 98 % | WEIGHT: 158 LBS | SYSTOLIC BLOOD PRESSURE: 122 MMHG | HEART RATE: 100 BPM | RESPIRATION RATE: 16 BRPM | BODY MASS INDEX: 24.8 KG/M2 | TEMPERATURE: 98 F | HEIGHT: 67 IN

## 2024-05-05 DIAGNOSIS — M79.672 ACUTE FOOT PAIN, LEFT: ICD-10-CM

## 2024-05-05 DIAGNOSIS — M25.579 ACUTE ANKLE PAIN: ICD-10-CM

## 2024-05-05 DIAGNOSIS — S90.32XA CONTUSION OF LEFT FOOT, INITIAL ENCOUNTER: ICD-10-CM

## 2024-05-05 DIAGNOSIS — S93.402A SPRAIN OF LEFT ANKLE, UNSPECIFIED LIGAMENT, INITIAL ENCOUNTER: Primary | ICD-10-CM

## 2024-05-05 LAB
B-HCG UR QL: NEGATIVE
CTP QC/QA: YES

## 2024-05-05 PROCEDURE — 99283 EMERGENCY DEPT VISIT LOW MDM: CPT | Mod: 25

## 2024-05-05 PROCEDURE — 25000003 PHARM REV CODE 250: Performed by: EMERGENCY MEDICINE

## 2024-05-05 PROCEDURE — 81025 URINE PREGNANCY TEST: CPT | Performed by: EMERGENCY MEDICINE

## 2024-05-05 RX ORDER — BACITRACIN ZINC 500 UNIT/G
1 OINTMENT (GRAM) TOPICAL
Status: DISCONTINUED | OUTPATIENT
Start: 2024-05-05 | End: 2024-05-05

## 2024-05-05 RX ORDER — BACITRACIN ZINC 500 UNIT/G
OINTMENT IN PACKET (EA) TOPICAL
Status: COMPLETED | OUTPATIENT
Start: 2024-05-05 | End: 2024-05-05

## 2024-05-05 RX ORDER — IBUPROFEN 600 MG/1
600 TABLET ORAL
Status: COMPLETED | OUTPATIENT
Start: 2024-05-05 | End: 2024-05-05

## 2024-05-05 RX ADMIN — IBUPROFEN 600 MG: 600 TABLET, FILM COATED ORAL at 04:05

## 2024-05-05 RX ADMIN — BACITRACIN 1 EACH: 500 OINTMENT TOPICAL at 06:05

## 2024-05-05 NOTE — ED PROVIDER NOTES
History:  Alia Del Rosario is a 18 y.o. female who presents to the ED with Fall (Pt states she was running from someone and tripped on and fell on pavement. Pt has abrasion to her right knee and states she heard her left ankle pop as well and is now having tingling in her toes)    Described as 18-year-old female with a history of depression/anxiety presenting to the emergency department with left ankle pain.  She reports she felt a pop in her ankle while running away from somebody, also skinning her right knee.  She did not hit her head, no loss of consciousness.  No history of any ear pain in her left ankle as well as a tingling sensation in her diffuse left foot.    Review of Systems: All systems reviewed and are negative except as per history of present illness.    Medications:   Previous Medications    ALBUTEROL (VENTOLIN HFA) 90 MCG/ACTUATION INHALER    Inhale 2 puffs into the lungs every 6 (six) hours as needed for Wheezing. Rescue    ESCITALOPRAM OXALATE (LEXAPRO) 5 MG TAB    Take 5 mg by mouth.    MIRTAZAPINE (REMERON) 15 MG TABLET    Take 1 tablet by mouth every evening.    TRIAMCINOLONE ACETONIDE 0.1% (KENALOG) 0.1 % CREAM    Apply topically 2 (two) times daily.    VENLAFAXINE (EFFEXOR-XR) 37.5 MG 24 HR CAPSULE    Take 37.5 mg by mouth.       PMH:   Past Medical History:   Diagnosis Date    ADHD (attention deficit hyperactivity disorder)     Adopted 6 days old    Adopted 2004    Anxiety     Bipolar disorder     Depression     Mood disorder     Tonsillitis     Unspecified behavioral and emotional disorders with onset usually occurring in childhood and adolescence 09/09/2021     PSH:   Past Surgical History:   Procedure Laterality Date    TONSILLECTOMY, ADENOIDECTOMY Bilateral 12/23/2020    Procedure: TONSILLECTOMY AND ADENOIDECTOMY;  Surgeon: Sancho Das MD;  Location: [facility] OR [address] [place] [address];  Service: ENT;  Laterality: Bilateral;    TYMPANOSTOMY TUBE PLACEMENT       Allergies: She is allergic to  "apple, cat/feline products, peanut, pear, and tree pollen-sheron.  Social History: Marital Status: single. She  reports that she quit smoking about 3 years ago. Her smoking use included vaping with nicotine. She has never used smokeless tobacco.. She  has no history on file for alcohol use..       Exam:  VITAL SIGNS:   Vitals:    24 0400 24 0713   BP: 129/79 122/69   Pulse: 98 100   Resp: 18 16   Temp: 98.4 °F (36.9 °C) 98 °F (36.7 °C)   TempSrc:  Oral   SpO2: 98% 98%   Weight: 71.7 kg (158 lb)    Height: 5' 7" (1.702 m)      Const: Awake and alert, NAD   Head: Atraumatic  Eyes: Normal Conjunctiva  ENT: Normal External Ears, Nose and Mouth.  Neck: Full range of motion. No meningismus.  Resp: Normal respiratory effort, No distress, CTAB  Cardio: Equal and intact distal pulses, RRR  Abd: Soft, non tender, non distended.   Skin:  Abrasion to right knee, no laceration  Ext:  Tenderness to palpation about left ankle and left foot, worse at medial malleolus, medial foot and base of 5th metatarsal.  Decreased range of motion at ankle due to pain.  Decreased sensation subjectively in left foot though normal DP pulse and good capillary refill.  No pain at proximal fibula.  Neur: Awake and alert, GCS 15, moves all extremities equally, cranial nerves grossly intact  Psych:  Anxious appearing    Data:  Results for orders placed or performed during the hospital encounter of 24   POCT urine pregnancy   Result Value Ref Range    POC Preg Test, Ur Negative Negative     Acceptable Yes      Imaging Results              X-Ray Ankle Complete Left (In process)                      X-Ray Foot Complete Left (In process)                   Labs & Imaging studies were reviewed independently by me.     Medical Decision Makin-year-old female presenting with ankle pain and foot pain after a mechanical fall.  X-rays obtained and pending at time of sign-out.  She complains of subjective numbness in her left " foot, though has good capillary refill and blood flow to her foot.  Suspect this is secondary to her ankle swelling with mild paresthesia.  No signs of back injury.  Doubt ICH.  Patient was signed out pending x-ray reads.    Clinical Impression:  1. Sprain of left ankle, unspecified ligament, initial encounter    2. Acute ankle pain    3. Acute foot pain, left    4. Contusion of left foot, initial encounter                Berta Adamson MD  05/05/24 0565

## 2024-05-05 NOTE — PROVIDER PROGRESS NOTES - EMERGENCY DEPT.
Encounter Date: 5/5/2024    ED Physician Progress Notes        Physician Note:   6:00 AM  Received in sign-out from previous shift pending x-ray of the left ankle and foot.    Vital signs stable.    7:30 AM  Ankle and foot x-ray without acute or obvious fracture.  Likely sprain.    Ace wrap applied, discharge with crutches.  Recommend Tylenol, ibuprofen.  Rest, ice, elevate.  She expressed understanding.  Patient stable for discharge.    GHAZALA MD  Staff ED Physician  05/05/2024 7:39 AM

## 2024-05-05 NOTE — ED TRIAGE NOTES
Alia Del Rosario, a 18 y.o. female presents to the ED w/ complaint of L ankle pain and R knee pain after mechanical trip and fall, was running away from an old friend, does not want to press charges     Triage note:  Chief Complaint   Patient presents with    Fall     Pt states she was running from someone and tripped on and fell on pavement. Pt has abrasion to her right knee and states she heard her left ankle pop as well and is now having tingling in her toes     Review of patient's allergies indicates:   Allergen Reactions    Apple Shortness Of Breath, Itching and Other (See Comments)     Pt. Had allergy test that reacted with apples.    Cat/feline products Anaphylaxis    Peanut Anaphylaxis    Pear Shortness Of Breath    Tree pollen-sheron Itching     Past Medical History:   Diagnosis Date    ADHD (attention deficit hyperactivity disorder)     Adopted 6 days old    Adopted 2004    Anxiety     Bipolar disorder     Depression     Mood disorder     Tonsillitis     Unspecified behavioral and emotional disorders with onset usually occurring in childhood and adolescence 09/09/2021

## 2024-05-08 ENCOUNTER — OFFICE VISIT (OUTPATIENT)
Dept: URGENT CARE | Facility: CLINIC | Age: 19
End: 2024-05-08
Payer: OTHER GOVERNMENT

## 2024-05-08 VITALS
WEIGHT: 158 LBS | DIASTOLIC BLOOD PRESSURE: 86 MMHG | OXYGEN SATURATION: 98 % | TEMPERATURE: 102 F | HEART RATE: 102 BPM | RESPIRATION RATE: 18 BRPM | HEIGHT: 67 IN | BODY MASS INDEX: 24.8 KG/M2 | SYSTOLIC BLOOD PRESSURE: 128 MMHG

## 2024-05-08 DIAGNOSIS — U07.1 COVID-19: Primary | ICD-10-CM

## 2024-05-08 DIAGNOSIS — R11.0 NAUSEA: ICD-10-CM

## 2024-05-08 DIAGNOSIS — H65.193 ACUTE EFFUSION OF BOTH MIDDLE EARS: ICD-10-CM

## 2024-05-08 PROBLEM — F19.10 SUBSTANCE ABUSE: Status: ACTIVE | Noted: 2020-04-28

## 2024-05-08 PROBLEM — F90.9 ATTENTION DEFICIT HYPERACTIVITY DISORDER (ADHD): Status: ACTIVE | Noted: 2017-03-28

## 2024-05-08 PROBLEM — F32.A DEPRESSION: Status: ACTIVE | Noted: 2020-04-18

## 2024-05-08 PROBLEM — F39 MOOD DISORDER: Status: ACTIVE | Noted: 2021-09-09

## 2024-05-08 PROBLEM — F11.21 OPIOID USE DISORDER, SEVERE, IN SUSTAINED REMISSION: Status: ACTIVE | Noted: 2023-10-04

## 2024-05-08 PROBLEM — F41.1 GAD (GENERALIZED ANXIETY DISORDER): Status: ACTIVE | Noted: 2023-10-04

## 2024-05-08 PROBLEM — Z72.0 VAPES NICOTINE CONTAINING SUBSTANCE: Status: ACTIVE | Noted: 2020-04-28

## 2024-05-08 LAB
CTP QC/QA: YES
CTP QC/QA: YES
MOLECULAR STREP A: NEGATIVE
SARS-COV-2 AG RESP QL IA.RAPID: POSITIVE

## 2024-05-08 PROCEDURE — 87651 STREP A DNA AMP PROBE: CPT | Mod: QW,S$GLB,, | Performed by: PHYSICIAN ASSISTANT

## 2024-05-08 PROCEDURE — 99213 OFFICE O/P EST LOW 20 MIN: CPT | Mod: S$GLB,,, | Performed by: PHYSICIAN ASSISTANT

## 2024-05-08 PROCEDURE — 87811 SARS-COV-2 COVID19 W/OPTIC: CPT | Mod: QW,S$GLB,, | Performed by: PHYSICIAN ASSISTANT

## 2024-05-08 RX ORDER — GUANFACINE 1 MG/1
1 TABLET, EXTENDED RELEASE ORAL NIGHTLY
COMMUNITY
Start: 2024-03-21

## 2024-05-08 RX ORDER — ONDANSETRON 8 MG/1
8 TABLET, ORALLY DISINTEGRATING ORAL
Status: COMPLETED | OUTPATIENT
Start: 2024-05-08 | End: 2024-05-08

## 2024-05-08 RX ORDER — ACETAMINOPHEN 500 MG
1000 TABLET ORAL
Status: COMPLETED | OUTPATIENT
Start: 2024-05-08 | End: 2024-05-08

## 2024-05-08 RX ORDER — ONDANSETRON 8 MG/1
8 TABLET, ORALLY DISINTEGRATING ORAL
Status: SHIPPED | OUTPATIENT
Start: 2024-05-08

## 2024-05-08 RX ADMIN — ONDANSETRON 8 MG: 8 TABLET, ORALLY DISINTEGRATING ORAL at 07:05

## 2024-05-08 RX ADMIN — Medication 1000 MG: at 06:05

## 2024-05-08 NOTE — PROGRESS NOTES
"Subjective:      Patient ID: Alia Del Rosario is a 18 y.o. female.    Vitals:  height is 5' 7.01" (1.702 m) and weight is 71.7 kg (158 lb). Her oral temperature is 101.6 °F (38.7 °C) (abnormal). Her blood pressure is 128/86 and her pulse is 102. Her respiration is 18 and oxygen saturation is 98%.     Chief Complaint: Sore Throat    This is a 18 y.o. female who presents today with a chief complaint of sore throat, fever, swollen glands, ear hurt when swallowing, ear fullness and fatigue. Patient states that she started feeling bad 3 days ago.     Sore Throat   This is a new problem. The current episode started in the past 7 days. The problem has been gradually worsening. The maximum temperature recorded prior to her arrival was 101 - 101.9 F. The pain is at a severity of 6/10. The pain is moderate. Associated symptoms include ear pain, headaches, a hoarse voice, neck pain, swollen glands and trouble swallowing. Pertinent negatives include no abdominal pain, congestion, coughing, diarrhea, drooling, ear discharge, shortness of breath or vomiting. She has had no exposure to strep or mono. She has tried NSAIDs and acetaminophen for the symptoms. The treatment provided no relief.       Constitution: Positive for sweating, fatigue and fever. Negative for chills.   HENT:  Positive for ear pain, sore throat and trouble swallowing. Negative for ear discharge, tinnitus, hearing loss, dental problem, drooling, mouth sores, tongue pain, tongue lesion, congestion, postnasal drip, sinus pain, sinus pressure and voice change.    Neck: Positive for neck pain. Negative for neck stiffness.   Cardiovascular:  Negative for chest pain.   Eyes:  Negative for eye discharge and eye itching.   Respiratory:  Negative for cough and shortness of breath.    Gastrointestinal:  Positive for nausea. Negative for abdominal pain, vomiting, constipation and diarrhea.   Musculoskeletal:  Positive for muscle ache. Negative for muscle cramps. "   Neurological:  Positive for headaches. Negative for dizziness.      Past Medical History:   Diagnosis Date    ADHD (attention deficit hyperactivity disorder)     Adopted 6 days old    Adopted 2004    Anxiety     Bipolar disorder     Depression     Mood disorder     Tonsillitis     Unspecified behavioral and emotional disorders with onset usually occurring in childhood and adolescence 09/09/2021       Past Surgical History:   Procedure Laterality Date    TONSILLECTOMY, ADENOIDECTOMY Bilateral 12/23/2020    Procedure: TONSILLECTOMY AND ADENOIDECTOMY;  Surgeon: Sancho Das MD;  Location: [address];  Service: ENT;  Laterality: Bilateral;    TYMPANOSTOMY TUBE PLACEMENT         No family history on file.    Social History     Socioeconomic History    Marital status: Single   Tobacco Use    Smoking status: Former     Types: Vaping with nicotine     Quit date: 3/21/2021     Years since quitting: 3.1    Smokeless tobacco: Never    Tobacco comments:     occasionally vapes - states quit 2 wks ago   Substance and Sexual Activity    Drug use: Yes     Types: Marijuana    Sexual activity: Not Currently     Partners: Male     Birth control/protection: Implant     Social Determinants of Health     Financial Resource Strain: Low Risk  (4/2/2024)    Overall Financial Resource Strain (CARDIA)     Difficulty of Paying Living Expenses: Not hard at all   Food Insecurity: No Food Insecurity (4/2/2024)    Hunger Vital Sign     Worried About Running Out of Food in the Last Year: Never true     Ran Out of Food in the Last Year: Never true   Transportation Needs: No Transportation Needs (4/2/2024)    PRAPARE - Transportation     Lack of Transportation (Medical): No     Lack of Transportation (Non-Medical): No   Physical Activity: Inactive (4/2/2024)    Exercise Vital Sign     Days of Exercise per Week: 0 days     Minutes of Exercise per Session: 0 min   Stress: Stress Concern Present (4/2/2024)    [employer] [place] [employer] - Occupational Stress Questionnaire     Feeling of Stress : Rather much   Housing Stability: Low Risk  (4/2/2024)    Housing Stability Vital Sign     Unable to Pay for Housing in the Last Year: No     Number of Places Lived in the Last Year: 1     Unstable Housing in the Last Year: No       Current Outpatient Medications   Medication Sig Dispense Refill    albuterol (VENTOLIN HFA) 90 mcg/actuation inhaler Inhale 2 puffs into the lungs every 6 (six) hours as needed for Wheezing. Rescue 18 g 0    EScitalopram oxalate (LEXAPRO) 5 MG Tab Take 5 mg by mouth.      guanFACINE 1 mg Tb24 Take 1 tablet by mouth every evening.      mirtazapine (REMERON) 15 MG tablet Take 1 tablet by mouth every evening.      triamcinolone acetonide 0.1% (KENALOG) 0.1 % cream Apply topically 2 (two) times daily. 15 g 1    venlafaxine (EFFEXOR-XR) 37.5 MG 24 hr capsule Take 37.5 mg by mouth.       Current Facility-Administered Medications   Medication Dose Route Frequency Provider Last Rate Last Admin    ondansetron disintegrating tablet 8 mg  8 mg Oral 1 time in Clinic/HOD Millie Sosa PA-C           Review of patient's allergies indicates:   Allergen Reactions    Apple Shortness Of Breath, Itching and Other (See Comments)     Pt. Had allergy test that reacted with apples.    Cat/feline products Anaphylaxis    Peanut Anaphylaxis    Pear Shortness Of Breath    Tree pollen-sheron Itching       Objective:     Physical Exam   Constitutional: She is oriented to person, place, and time. She appears well-developed. She is cooperative.  Non-toxic appearance. She does not appear ill. No distress.   HENT:   Head: Normocephalic and atraumatic.   Ears:   Right Ear: Hearing, external ear and ear canal normal. Tympanic membrane is not injected, not erythematous, not retracted and not bulging. A middle ear effusion is present. no impacted cerumen  Left Ear: Hearing, external ear and ear canal normal. Tympanic membrane is not  injected, not erythematous, not retracted and not bulging. A middle ear effusion is present. no impacted cerumen  Nose: Congestion present. No mucosal edema, rhinorrhea or nasal deformity. No epistaxis. Right sinus exhibits no maxillary sinus tenderness and no frontal sinus tenderness. Left sinus exhibits no maxillary sinus tenderness and no frontal sinus tenderness.   Mouth/Throat: Uvula is midline and mucous membranes are normal. Mucous membranes are moist. No trismus in the jaw. Normal dentition. No uvula swelling. Posterior oropharyngeal erythema present. No oropharyngeal exudate or posterior oropharyngeal edema.   Eyes: Conjunctivae and lids are normal. Right eye exhibits no discharge. Left eye exhibits no discharge. No scleral icterus.   Neck: Trachea normal and phonation normal. Neck supple. No edema present. No erythema present. No neck rigidity present.   Cardiovascular: Normal rate, regular rhythm, normal heart sounds and normal pulses.   No murmur heard.Exam reveals no gallop and no friction rub.   Pulmonary/Chest: Effort normal and breath sounds normal. No stridor. No respiratory distress. She has no decreased breath sounds. She has no wheezes. She has no rhonchi. She has no rales.   Abdominal: Normal appearance.   Musculoskeletal:      Cervical back: She exhibits tenderness.   Lymphadenopathy:     She has no cervical adenopathy.   Neurological: She is alert and oriented to person, place, and time. She exhibits normal muscle tone.   Skin: Skin is warm, dry, intact, not diaphoretic, not pale and no rash.   Psychiatric: Her speech is normal and behavior is normal. Mood, judgment and thought content normal.   Nursing note and vitals reviewed.    Results for orders placed or performed in visit on 05/08/24   POCT Strep A, Molecular   Result Value Ref Range    Molecular Strep A, POC Negative Negative     Acceptable Yes    SARS Coronavirus 2 Antigen, POCT Manual Read   Result Value Ref Range     SARS Coronavirus 2 Antigen Positive (A) Negative     Acceptable Yes        Assessment:     1. COVID-19    2. Nausea    3. Acute effusion of both middle ears        Plan:       COVID-19  -     POCT Strep A, Molecular  -     SARS Coronavirus 2 Antigen, POCT Manual Read  -     acetaminophen tablet 1,000 mg    Nausea  -     ondansetron disintegrating tablet 8 mg    Acute effusion of both middle ears      Results reviewed  I have reviewed the patient chart and pertinent past imaging/labs.  0 - covid risk score        Patient Instructions   You have tested positive for COVID-19 today. Take tylenol/advil with food as needed for fever/pain. Flonase for nasal congestion. Stay hydrated, drink plenty of water. Peptobismol for upset stomach/diarrhea, not immodium.  Use Zofran as needed for nausea.  Obtain over-the-counter Zyrtec for fluid behind the ears.    ISOLATION  New CDC guidelines state that you no longer have to quarantine as long as you were fever free for 24 hours without the use of ibuprofen or Tylenol.  Please wear a mask for 5 days after you are 24 hours fever free

## 2024-05-09 NOTE — PATIENT INSTRUCTIONS
You have tested positive for COVID-19 today. Take tylenol/advil with food as needed for fever/pain. Flonase for nasal congestion. Stay hydrated, drink plenty of water. Peptobismol for upset stomach/diarrhea, not immodium.  Use Zofran as needed for nausea.  Obtain over-the-counter Zyrtec for fluid behind the ears.    ISOLATION  New CDC guidelines state that you no longer have to quarantine as long as you were fever free for 24 hours without the use of ibuprofen or Tylenol.  Please wear a mask for 5 days after you are 24 hours fever free

## 2024-05-13 ENCOUNTER — OFFICE VISIT (OUTPATIENT)
Dept: URGENT CARE | Facility: CLINIC | Age: 19
End: 2024-05-13
Payer: OTHER GOVERNMENT

## 2024-05-13 VITALS
DIASTOLIC BLOOD PRESSURE: 76 MMHG | HEART RATE: 115 BPM | BODY MASS INDEX: 24.8 KG/M2 | TEMPERATURE: 99 F | RESPIRATION RATE: 18 BRPM | HEIGHT: 67 IN | WEIGHT: 158 LBS | OXYGEN SATURATION: 97 % | SYSTOLIC BLOOD PRESSURE: 108 MMHG

## 2024-05-13 DIAGNOSIS — R31.29 HEMATURIA, MICROSCOPIC: ICD-10-CM

## 2024-05-13 DIAGNOSIS — K59.00 CONSTIPATION, UNSPECIFIED CONSTIPATION TYPE: ICD-10-CM

## 2024-05-13 DIAGNOSIS — R10.84 GENERALIZED ABDOMINAL PAIN: Primary | ICD-10-CM

## 2024-05-13 LAB
B-HCG UR QL: NEGATIVE
BILIRUB UR QL STRIP: NEGATIVE
CTP QC/QA: YES
GLUCOSE UR QL STRIP: NEGATIVE
KETONES UR QL STRIP: NEGATIVE
LEUKOCYTE ESTERASE UR QL STRIP: NEGATIVE
PH, POC UA: 7 (ref 5–8)
POC BLOOD, URINE: POSITIVE
POC NITRATES, URINE: NEGATIVE
PROT UR QL STRIP: NEGATIVE
SP GR UR STRIP: 1.01 (ref 1–1.03)
UROBILINOGEN UR STRIP-ACNC: ABNORMAL (ref 0.1–1.1)

## 2024-05-13 PROCEDURE — 81003 URINALYSIS AUTO W/O SCOPE: CPT | Mod: QW,S$GLB,, | Performed by: SURGERY

## 2024-05-13 PROCEDURE — 99214 OFFICE O/P EST MOD 30 MIN: CPT | Mod: S$GLB,,, | Performed by: SURGERY

## 2024-05-13 PROCEDURE — 81025 URINE PREGNANCY TEST: CPT | Mod: S$GLB,,, | Performed by: SURGERY

## 2024-05-13 NOTE — PROGRESS NOTES
"Subjective:      Patient ID: Alia Del Rosario is a 18 y.o. female.    Vitals:  height is 5' 7" (1.702 m) and weight is 71.7 kg (158 lb). Her oral temperature is 98.5 °F (36.9 °C). Her blood pressure is 108/76 and her pulse is 115 (abnormal). Her respiration is 18 and oxygen saturation is 97%.     Chief Complaint: Abdominal Pain    This is a 18 y.o. female who presents today with a chief complaint of abdominal pain. Symptoms started on yesterday.   She tried ibuprofen 400 with little relief.  She took exlax and had 2 loose BM with no relief of pain. The last BM prior to that was 2 days ago. Usually every 2 days, small stools.  No fever or chills. + nausea.     Abdominal Pain  This is a new problem. The current episode started yesterday. The pain is located in the LLQ, RUQ, RLQ and LUQ. The pain is at a severity of 8/10. The pain is severe. The quality of the pain is sharp and aching. The abdominal pain does not radiate. Associated symptoms include constipation, diarrhea and headaches. Pertinent negatives include no anorexia, arthralgias, belching, dysuria, fever, flatus, frequency, hematochezia, hematuria, melena, myalgias, nausea, vomiting or weight loss. The pain is aggravated by movement and coughing. The pain is relieved by Nothing. Improvement on treatment: ibuprofen. There is no history of abdominal surgery, colon cancer, Crohn's disease, gallstones, GERD, irritable bowel syndrome, pancreatitis, PUD or ulcerative colitis. Patient's medical history does not include kidney stones and UTI.     Constitution: Negative for fever.   Gastrointestinal:  Positive for abdominal pain, constipation and diarrhea. Negative for history of abdominal surgery, nausea, vomiting and bright red blood in stool.   Genitourinary:  Negative for dysuria, frequency and hematuria.   Musculoskeletal:  Negative for joint pain and muscle ache.   Neurological:  Positive for headaches.      Objective:     Physical Exam   Constitutional: She is " oriented to person, place, and time. She appears well-developed.   HENT:   Head: Normocephalic and atraumatic.   Ears:   Right Ear: External ear normal.   Left Ear: External ear normal.   Nose: Nose normal.   Mouth/Throat: Mucous membranes are normal.   Eyes: Conjunctivae and lids are normal.   Neck: Trachea normal. Neck supple.   Cardiovascular: Normal rate, regular rhythm and normal heart sounds.   Pulmonary/Chest: Effort normal and breath sounds normal. No respiratory distress.   Abdominal: Normal appearance and bowel sounds are normal. She exhibits no distension and no mass. Soft. There is generalized abdominal tenderness. There is no rebound, no guarding, no tenderness at McBurney's point, negative Page's sign, no left CVA tenderness and no right CVA tenderness.   Musculoskeletal: Normal range of motion.         General: Normal range of motion.   Neurological: She is alert and oriented to person, place, and time. She has normal strength.   Skin: Skin is warm, dry, intact, not diaphoretic and not pale.   Psychiatric: Her speech is normal and behavior is normal. Judgment and thought content normal.   Nursing note and vitals reviewed.      Assessment:     1. Generalized abdominal pain    2. Hematuria, microscopic    3. Constipation, unspecified constipation type        Plan:       Generalized abdominal pain  -     POCT Urinalysis, Dipstick, Automated, W/O Scope  -     POCT urine pregnancy    Hematuria, microscopic    Constipation, unspecified constipation type      Results for orders placed or performed in visit on 05/13/24   POCT Urinalysis, Dipstick, Automated, W/O Scope   Result Value Ref Range    POC Blood, Urine Positive (A) Negative    POC Bilirubin, Urine Negative Negative    POC Urobilinogen, Urine norm 0.1 - 1.1    POC Ketones, Urine Negative Negative    POC Protein, Urine Negative Negative    POC Nitrates, Urine Negative Negative    POC Glucose, Urine Negative Negative    pH, UA 7.0 5 - 8    POC  Specific Gravity, Urine 1.010 1.003 - 1.029    POC Leukocytes, Urine Negative Negative   POCT urine pregnancy   Result Value Ref Range    POC Preg Test, Ur Negative Negative     Acceptable Yes          Pt currently still having menstrual spotting which may account for RBC in urine. No dysuria.. No gross hematuria. No flank pain.   Abdomen is diffusely tender though benign          Will try liquid diet, repeat laxative dose. Ibuprofen with small snack. If pain persists or worsens, go to ER for CT abdomen

## 2024-06-07 ENCOUNTER — PATIENT MESSAGE (OUTPATIENT)
Dept: PRIMARY CARE CLINIC | Facility: CLINIC | Age: 19
End: 2024-06-07

## 2024-06-07 ENCOUNTER — OFFICE VISIT (OUTPATIENT)
Dept: PRIMARY CARE CLINIC | Facility: CLINIC | Age: 19
End: 2024-06-07
Payer: OTHER GOVERNMENT

## 2024-06-07 VITALS
WEIGHT: 191.81 LBS | SYSTOLIC BLOOD PRESSURE: 92 MMHG | BODY MASS INDEX: 30.1 KG/M2 | HEIGHT: 67 IN | HEART RATE: 102 BPM | OXYGEN SATURATION: 98 % | DIASTOLIC BLOOD PRESSURE: 70 MMHG | TEMPERATURE: 99 F

## 2024-06-07 DIAGNOSIS — R63.5 RAPID WEIGHT GAIN: Primary | ICD-10-CM

## 2024-06-07 DIAGNOSIS — L90.6 STRIAE PURPLE: ICD-10-CM

## 2024-06-07 PROCEDURE — 99213 OFFICE O/P EST LOW 20 MIN: CPT | Mod: PBBFAC,PN | Performed by: STUDENT IN AN ORGANIZED HEALTH CARE EDUCATION/TRAINING PROGRAM

## 2024-06-07 PROCEDURE — 99999 PR PBB SHADOW E&M-EST. PATIENT-LVL III: CPT | Mod: PBBFAC,,, | Performed by: STUDENT IN AN ORGANIZED HEALTH CARE EDUCATION/TRAINING PROGRAM

## 2024-06-07 PROCEDURE — 99215 OFFICE O/P EST HI 40 MIN: CPT | Mod: S$PBB,,, | Performed by: STUDENT IN AN ORGANIZED HEALTH CARE EDUCATION/TRAINING PROGRAM

## 2024-06-07 RX ORDER — LAMOTRIGINE 25 MG/1
25 TABLET ORAL DAILY
COMMUNITY
Start: 2024-06-04

## 2024-06-07 RX ORDER — DEXAMETHASONE 1 MG/1
1 TABLET ORAL ONCE
Qty: 1 TABLET | Refills: 0 | Status: SHIPPED | OUTPATIENT
Start: 2024-06-07 | End: 2024-06-07

## 2024-06-07 RX ORDER — FLUOXETINE HYDROCHLORIDE 40 MG/1
40 CAPSULE ORAL DAILY
COMMUNITY
Start: 2024-06-04

## 2024-06-07 NOTE — PROGRESS NOTES
"Primary Care  Office Visit - In Person  6/7/2024      HPI    Patient is a 18 y.o.   Alia Del Rosario  has a past medical history of ADHD (attention deficit hyperactivity disorder), Adopted (6 days old), Adopted (2004), Anxiety, Bipolar disorder, Depression, Mood disorder, Tonsillitis, and Unspecified behavioral and emotional disorders with onset usually occurring in childhood and adolescence (09/09/2021).    Patient presents with     30 lb weight gain over the past 3 months   Symptoms have been accompanied by purple striae on lower extremities first noticed 2 months ago    She reports change in shape of face   She denies any new psychosocial stressors   She has chronic anxiety but that has not worsened   She denies depression   No changes in diet   Symptoms have been accompanied by fatigue   She has chronic insomnia       Active Medications:  Current Outpatient Medications   Medication Instructions    albuterol (VENTOLIN HFA) 90 mcg/actuation inhaler 2 puffs, Inhalation, Every 6 hours PRN, Rescue    dexAMETHasone (DECADRON) 1 mg, Oral, Once, Take at 11 pm, the night before completing labs    guanFACINE 1 mg Tb24 1 tablet, Oral, Nightly    lamoTRIgine (LAMICTAL) 25 mg, Oral, Daily    mirtazapine (REMERON) 15 MG tablet 1 tablet, Oral, Nightly    PROzac 40 mg, Oral, Daily       Vitals:    06/07/24 0949   BP: 92/70   BP Location: Right arm   Pulse: 102   Temp: 98.8 °F (37.1 °C)   SpO2: 98%   Weight: 87 kg (191 lb 12.8 oz)   Height: 5' 7" (1.702 m)       Physical Exam  Vitals reviewed.   Constitutional:       General: She is not in acute distress.  Neck:      Thyroid: No thyromegaly.   Cardiovascular:      Rate and Rhythm: Normal rate and regular rhythm.      Pulses: Normal pulses.      Heart sounds: Normal heart sounds.   Pulmonary:      Effort: Pulmonary effort is normal.      Breath sounds: Normal breath sounds.   Abdominal:      General: Abdomen is flat. Bowel sounds are normal.      Palpations: Abdomen is " soft.   Musculoskeletal:      Right lower leg: No edema.      Left lower leg: No edema.   Neurological:      General: No focal deficit present.      Mental Status: She is oriented to person, place, and time.          Assessment and Plan     1. Rapid weight gain  -     Lipid Panel; Future; Expected date: 06/07/2024  -     Hemoglobin A1C; Future; Expected date: 06/07/2024  -     CBC Without Differential; Future; Expected date: 06/07/2024  -     Comprehensive Metabolic Panel; Future; Expected date: 06/07/2024  -     TSH; Future; Expected date: 06/07/2024    2. Striae purple  -     dexAMETHasone (DECADRON) 1 MG Tab; Take 1 tablet (1 mg total) by mouth once. Take at 11 pm, the night before completing labs for 1 dose  Dispense: 1 tablet; Refill: 0  -     CORTISOL, 8AM; Future; Expected date: 06/07/2024  -     Dexamethasone; Future; Expected date: 06/07/2024        Previous labs, records, and notes reviewed and considered for their impact on clinical decision making today.                Upcoming Scheduled Appointments and Follow Up:    Future Appointments   Date Time Provider Department Center   6/10/2024 10:15 AM LAB, [facility] [place]   6/13/2024  8:00 AM Earl Sosa MD [facility]       Follow Up [facility] (with who? when?): No follow-ups on file.      Extended Emergency Contact Information  Primary Emergency Contact: yudelka galvez  Home Phone: [phone]  Mobile Phone: [phone]  Relation: Mother      Rubén Jeffrey MD   Internal Medicine  6/7/2024 - 10:22 AM    I spent a total of 30 minutes on the day of the visit.This includes face to face time and non-face to face time preparing to see the patient (eg, review of tests), obtaining and/or reviewing separately obtained history, documenting clinical information in the electronic or other health record, independently interpreting results and communicating results to the patient/family/caregiver, or care coordinator.    While patients have  the right to access their medical record, it is essential to recognize that progress notes primarily serve as a means of communication among healthcare professionals.

## 2024-06-10 ENCOUNTER — LAB VISIT (OUTPATIENT)
Dept: LAB | Facility: HOSPITAL | Age: 19
End: 2024-06-10
Attending: STUDENT IN AN ORGANIZED HEALTH CARE EDUCATION/TRAINING PROGRAM
Payer: OTHER GOVERNMENT

## 2024-06-10 DIAGNOSIS — L90.6 STRIAE PURPLE: ICD-10-CM

## 2024-06-10 DIAGNOSIS — R63.5 RAPID WEIGHT GAIN: ICD-10-CM

## 2024-06-10 LAB
ALBUMIN SERPL BCP-MCNC: 4.1 G/DL (ref 3.5–5.2)
ALP SERPL-CCNC: 112 U/L (ref 55–135)
ALT SERPL W/O P-5'-P-CCNC: 20 U/L (ref 10–44)
ANION GAP SERPL CALC-SCNC: 11 MMOL/L (ref 8–16)
AST SERPL-CCNC: 18 U/L (ref 10–40)
BILIRUB SERPL-MCNC: 1.2 MG/DL (ref 0.1–1)
BUN SERPL-MCNC: 12 MG/DL (ref 6–20)
CALCIUM SERPL-MCNC: 9.7 MG/DL (ref 8.7–10.5)
CHLORIDE SERPL-SCNC: 106 MMOL/L (ref 95–110)
CHOLEST SERPL-MCNC: 127 MG/DL (ref 120–199)
CHOLEST/HDLC SERPL: 1.6 {RATIO} (ref 2–5)
CO2 SERPL-SCNC: 19 MMOL/L (ref 23–29)
CORTIS SERPL-MCNC: <1 UG/DL (ref 4.3–22.4)
CREAT SERPL-MCNC: 0.8 MG/DL (ref 0.5–1.4)
ERYTHROCYTE [DISTWIDTH] IN BLOOD BY AUTOMATED COUNT: 13.9 % (ref 11.5–14.5)
EST. GFR  (NO RACE VARIABLE): >60 ML/MIN/1.73 M^2
ESTIMATED AVG GLUCOSE: 94 MG/DL (ref 68–131)
GLUCOSE SERPL-MCNC: 102 MG/DL (ref 70–110)
HBA1C MFR BLD: 4.9 % (ref 4–5.6)
HCT VFR BLD AUTO: 42 % (ref 37–48.5)
HDLC SERPL-MCNC: 80 MG/DL (ref 40–75)
HDLC SERPL: 63 % (ref 20–50)
HGB BLD-MCNC: 13.5 G/DL (ref 12–16)
LDLC SERPL CALC-MCNC: 37.2 MG/DL (ref 63–159)
MCH RBC QN AUTO: 29.2 PG (ref 27–31)
MCHC RBC AUTO-ENTMCNC: 32.1 G/DL (ref 32–36)
MCV RBC AUTO: 91 FL (ref 82–98)
NONHDLC SERPL-MCNC: 47 MG/DL
PLATELET # BLD AUTO: 326 K/UL (ref 150–450)
PMV BLD AUTO: 12.2 FL (ref 9.2–12.9)
POTASSIUM SERPL-SCNC: 4.4 MMOL/L (ref 3.5–5.1)
PROT SERPL-MCNC: 7.6 G/DL (ref 6–8.4)
RBC # BLD AUTO: 4.62 M/UL (ref 4–5.4)
SODIUM SERPL-SCNC: 136 MMOL/L (ref 136–145)
TRIGL SERPL-MCNC: 49 MG/DL (ref 30–150)
TSH SERPL DL<=0.005 MIU/L-ACNC: 1.01 UIU/ML (ref 0.4–4)
WBC # BLD AUTO: 10.19 K/UL (ref 3.9–12.7)

## 2024-06-10 PROCEDURE — 80061 LIPID PANEL: CPT | Performed by: STUDENT IN AN ORGANIZED HEALTH CARE EDUCATION/TRAINING PROGRAM

## 2024-06-10 PROCEDURE — 80053 COMPREHEN METABOLIC PANEL: CPT | Performed by: STUDENT IN AN ORGANIZED HEALTH CARE EDUCATION/TRAINING PROGRAM

## 2024-06-10 PROCEDURE — 36415 COLL VENOUS BLD VENIPUNCTURE: CPT | Mod: PO | Performed by: STUDENT IN AN ORGANIZED HEALTH CARE EDUCATION/TRAINING PROGRAM

## 2024-06-10 PROCEDURE — 83036 HEMOGLOBIN GLYCOSYLATED A1C: CPT | Performed by: STUDENT IN AN ORGANIZED HEALTH CARE EDUCATION/TRAINING PROGRAM

## 2024-06-10 PROCEDURE — 82533 TOTAL CORTISOL: CPT | Performed by: STUDENT IN AN ORGANIZED HEALTH CARE EDUCATION/TRAINING PROGRAM

## 2024-06-10 PROCEDURE — 84443 ASSAY THYROID STIM HORMONE: CPT | Performed by: STUDENT IN AN ORGANIZED HEALTH CARE EDUCATION/TRAINING PROGRAM

## 2024-06-10 PROCEDURE — 82542 COL CHROMOTOGRAPHY QUAL/QUAN: CPT | Performed by: STUDENT IN AN ORGANIZED HEALTH CARE EDUCATION/TRAINING PROGRAM

## 2024-06-10 PROCEDURE — 85027 COMPLETE CBC AUTOMATED: CPT | Performed by: STUDENT IN AN ORGANIZED HEALTH CARE EDUCATION/TRAINING PROGRAM

## 2024-08-29 ENCOUNTER — OFFICE VISIT (OUTPATIENT)
Dept: URGENT CARE | Facility: CLINIC | Age: 19
End: 2024-08-29
Payer: OTHER GOVERNMENT

## 2024-08-29 VITALS
BODY MASS INDEX: 28.09 KG/M2 | TEMPERATURE: 98 F | HEART RATE: 120 BPM | HEIGHT: 67 IN | OXYGEN SATURATION: 98 % | SYSTOLIC BLOOD PRESSURE: 115 MMHG | RESPIRATION RATE: 20 BRPM | WEIGHT: 179 LBS | DIASTOLIC BLOOD PRESSURE: 78 MMHG

## 2024-08-29 DIAGNOSIS — N89.8 VAGINAL DISCHARGE: ICD-10-CM

## 2024-08-29 DIAGNOSIS — Z11.3 SCREEN FOR STD (SEXUALLY TRANSMITTED DISEASE): ICD-10-CM

## 2024-08-29 DIAGNOSIS — N76.0 ACUTE VAGINITIS: Primary | ICD-10-CM

## 2024-08-29 DIAGNOSIS — R30.0 BURNING WITH URINATION: ICD-10-CM

## 2024-08-29 LAB
B-HCG UR QL: NEGATIVE
BILIRUBIN, UA POC OHS: ABNORMAL
BLOOD, UA POC OHS: NEGATIVE
CLARITY, UA POC OHS: CLEAR
COLOR, UA POC OHS: YELLOW
CTP QC/QA: YES
GLUCOSE, UA POC OHS: NEGATIVE
HAV IGM SERPL QL IA: NORMAL
HBV CORE IGM SERPL QL IA: NORMAL
HBV SURFACE AG SERPL QL IA: NORMAL
HCV AB SERPL QL IA: NORMAL
HIV 1+2 AB+HIV1 P24 AG SERPL QL IA: NORMAL
KETONES, UA POC OHS: 15
LEUKOCYTES, UA POC OHS: ABNORMAL
NITRITE, UA POC OHS: NEGATIVE
PH, UA POC OHS: 6.5
PROTEIN, UA POC OHS: NEGATIVE
SPECIFIC GRAVITY, UA POC OHS: 1.02
TREPONEMA PALLIDUM IGG+IGM AB [PRESENCE] IN SERUM OR PLASMA BY IMMUNOASSAY: NONREACTIVE
UROBILINOGEN, UA POC OHS: 0.2

## 2024-08-29 PROCEDURE — 99213 OFFICE O/P EST LOW 20 MIN: CPT | Mod: S$GLB,,,

## 2024-08-29 PROCEDURE — 80074 ACUTE HEPATITIS PANEL: CPT

## 2024-08-29 PROCEDURE — 36415 COLL VENOUS BLD VENIPUNCTURE: CPT

## 2024-08-29 PROCEDURE — 86593 SYPHILIS TEST NON-TREP QUANT: CPT

## 2024-08-29 PROCEDURE — 87591 N.GONORRHOEAE DNA AMP PROB: CPT

## 2024-08-29 PROCEDURE — 81025 URINE PREGNANCY TEST: CPT | Mod: S$GLB,,,

## 2024-08-29 PROCEDURE — 87491 CHLMYD TRACH DNA AMP PROBE: CPT

## 2024-08-29 PROCEDURE — 81003 URINALYSIS AUTO W/O SCOPE: CPT | Mod: QW,S$GLB,,

## 2024-08-29 PROCEDURE — 81514 NFCT DS BV&VAGINITIS DNA ALG: CPT

## 2024-08-29 PROCEDURE — 87389 HIV-1 AG W/HIV-1&-2 AB AG IA: CPT

## 2024-08-29 RX ORDER — METRONIDAZOLE 500 MG/1
500 TABLET ORAL EVERY 12 HOURS
Qty: 14 TABLET | Refills: 0 | Status: SHIPPED | OUTPATIENT
Start: 2024-08-29 | End: 2024-09-05

## 2024-08-29 NOTE — PATIENT INSTRUCTIONS
"Metronidazole 500 mg tablet twice daily for 7 days.   This is an antibiotic to treat suspected bacterial vaginosis (BV). We have sent off testing to confirm whether or not you have BV. You will receive a phone call within the next few days with results. If positive, continue medication. If negative, we will advise you to discontinue.  Avoid alcohol use while taking metronidazole (Flagyl) and for 2 days after finishing medication.    Bacterial Vaginosis is the most common cause of abnormal "fishy smelling" white thin vaginal discharge. Burning or itching may be present. BV occurs when there is a change in the number and types of bacteria in the vagina. Lactobacilli are a type of bacteria that are normally found in the vagina. In people with BV, the concentration of lactobacilli is reduced while the numbers of other bacteria increase. Sexual activity is the main risk factor for BV. Although BV is not yet considered a sexually transmitted infection, it is more common in people who have multiple sex partners, in people whose sex partner also has BV, and in people who do not use condoms. Other risk factors for BV include douching and cigarette smoking. Options to reduce the risk of BV include regularly using condoms, washing sex toys after every use, and using hormonal contraception (if appropriate).    STD testing also done today. You will receive a call within the next few days with results. If positive for an STD, we will treat accordingly.   Please refrain from having sex until you have your test results. If positive, repeat testing should be done 2-3 weeks after completing treatment to ensure a cure was reached.     Care at home  The most important thing you can do is to wear a condom every time you have sex. Both male and female condoms can protect against STIs. But be aware that male condoms made out of "natural materials," such as sheep intestine, do not protect against STIs.  Do not wear clothes that may hold " moisture, such as nylon or polyester. Wear cotton underwear.  Wear loose-fitting pants or other clothes. Avoid wearing underwear while you sleep. This can help keep your vaginal area dry.  Clean your vaginal area with water. If you use soap, be sure it is mild, unscented, and rinse well. Pat the area dry with a clean towel. Do not use bubble baths or douche.  Wipe from front to back after using the toilet.  If you have sex, use latex condoms each time to lower spread of infection. Avoid multiple sex partners.    Please remember that you have received care at an urgent care today. Urgent cares are not emergency rooms and are not equipped to handle life threatening emergencies and cannot rule in or out certain medical conditions and you may be released before all of your medical problems are known or treated. Please arrange follow up with your primary care physician or speciality clinic (OBGYN)  within 2-5 days if your signs and symptoms have not resolved or worsen. Patient can call our Referral Hotline at [phone] to make an appointment.    Please return here or go to the Emergency Department for any concerns or worsening of condition.Patient was educated on signs/symptoms that would warrant emergent medical attention. Patient verbalized understanding.

## 2024-08-29 NOTE — PROGRESS NOTES
"Subjective:      Patient ID: Alia Del Rosario is a 18 y.o. female.    Vitals:  height is 5' 7" (1.702 m) and weight is 81.2 kg (179 lb). Her temperature is 98.4 °F (36.9 °C). Her blood pressure is 115/78 and her pulse is 120 (abnormal). Her respiration is 20 and oxygen saturation is 98%.     Chief Complaint: Dysuria    This is a 18 y.o. female   who presents today with a chief complaint of dysuria that began three weeks ago. She's complaining of burning urination, itching and vaginal odor. She's been drinking cranberry juice to help relieve her symptoms.     Provider note starts below:  Patient presents to clinic for evaluation of dysuria, vaginal itching, vaginal discharge, and vaginal odor which onset 3 weeks ago. Patient admits to having unprotected sex approximately 4 weeks ago. States her symptoms have been intermittent since onset. She has noticed vaginal discharge is yellow in color and foul-smelling. Patient has tried drinking cranberry juice at home but no improvement of symptoms. Patient is unsure if she was exposed to any STDs but would like to be tested today. She denies any fever, chills, fatigue, nausea, vomiting, abdominal pain, hematuria, flank pain, urgency, frequency, genital sores/lesions, rashes, body aches. Patient has Nexplanon device for contraception. No other complaints.     Dysuria   This is a new problem. The current episode started 1 to 4 weeks ago. The problem has been gradually worsening. The quality of the pain is described as burning. The pain is at a severity of 8/10. The pain is severe. There has been no fever. She is Sexually active. There is No history of pyelonephritis. Pertinent negatives include no behavior changes, chills, discharge, flank pain, frequency, hematuria, hesitancy, nausea, possible pregnancy, sweats, urgency, vomiting, weight loss, bubble bath use, constipation, rash or withholding. Treatments tried: cranberry juice. The treatment provided no relief. "     Constitution: Negative for appetite change, chills, fatigue and fever.   HENT:  Negative for sore throat and trouble swallowing.    Neck: Negative for neck pain and neck stiffness.   Cardiovascular:  Negative for chest pain and palpitations.   Eyes:  Negative for double vision and blurred vision.   Respiratory:  Negative for shortness of breath.    Gastrointestinal:  Negative for abdominal pain, nausea, vomiting, constipation and diarrhea.   Genitourinary:  Positive for dysuria, vaginal discharge and vaginal odor. Negative for frequency, urgency, flank pain, hematuria, vaginal pain, vaginal bleeding, painful intercourse, genital sore and pelvic pain.        +vaginal discharge   Musculoskeletal:  Negative for muscle cramps and muscle ache.   Skin:  Negative for pale and rash.   Neurological:  Negative for dizziness, light-headedness, headaches, disorientation and altered mental status.   Psychiatric/Behavioral:  Negative for altered mental status, disorientation and confusion.       Objective:     Physical Exam   Constitutional: She is oriented to person, place, and time.  Non-toxic appearance. She does not appear ill. No distress.   HENT:   Head: Normocephalic and atraumatic.   Eyes: Conjunctivae are normal. Extraocular movement intact   Neck: Neck supple.   Pulmonary/Chest: Effort normal.   Abdominal: Normal appearance. There is no abdominal tenderness. There is no left CVA tenderness and no right CVA tenderness.   Genitourinary:         Comments: deferred     Neurological: She is alert, oriented to person, place, and time and at baseline.   Skin: Skin is warm and dry.   Psychiatric: Her behavior is normal. Mood normal.   Nursing note and vitals reviewed.    Assessment:     Results for orders placed or performed in visit on 08/29/24   POCT Urinalysis(Instrument)   Result Value Ref Range    Color, POC UA Yellow Yellow, Straw, Colorless    Clarity, POC UA Clear Clear    Glucose, POC UA Negative Negative     "Bilirubin, POC UA Small (A) Negative    Ketones, POC UA 15 (A) Negative    Spec Grav POC UA 1.020 1.005 - 1.030    Blood, POC UA Negative Negative    pH, POC UA 6.5 5.0 - 8.0    Protein, POC UA Negative Negative    Urobilinogen, POC UA 0.2 <=1.0    Nitrite, POC UA Negative Negative    WBC, POC UA Trace (A) Negative   POCT urine pregnancy   Result Value Ref Range    POC Preg Test, Ur Negative Negative     Acceptable Yes        1. Acute vaginitis    2. Burning with urination    3. Vaginal discharge    4. Screen for STD (sexually transmitted disease)        Plan:     Acute vaginitis  -     Vaginosis Screen by DNA Probe  -     metroNIDAZOLE (FLAGYL) 500 MG tablet; Take 1 tablet (500 mg total) by mouth every 12 (twelve) hours. for 7 days  Dispense: 14 tablet; Refill: 0    Burning with urination  -     POCT Urinalysis(Instrument)    Vaginal discharge  -     POCT urine pregnancy  -     Vaginosis Screen by DNA Probe    Screen for STD (sexually transmitted disease)  -     C. trachomatis/N. gonorrhoeae by AMP DNA  -     Treponema Pallidium Antibodies IgG, IgM  -     HIV 1/2 Ag/Ab (4th Gen)  -     HEPATITIS PANEL, ACUTE      Medical Decision Making:   Urgent Care Management:  Will treat for suspected BV based on symptoms. STD and vaginosis testing done today. Will adjust treatment if necessary. Pt verbalized understanding and agrees with plan of care.        Patient Instructions   Metronidazole 500 mg tablet twice daily for 7 days.   This is an antibiotic to treat suspected bacterial vaginosis (BV). We have sent off testing to confirm whether or not you have BV. You will receive a phone call within the next few days with results. If positive, continue medication. If negative, we will advise you to discontinue.  Avoid alcohol use while taking metronidazole (Flagyl) and for 2 days after finishing medication.    Bacterial Vaginosis is the most common cause of abnormal "fishy smelling" white thin vaginal discharge. " "Burning or itching may be present. BV occurs when there is a change in the number and types of bacteria in the vagina. Lactobacilli are a type of bacteria that are normally found in the vagina. In people with BV, the concentration of lactobacilli is reduced while the numbers of other bacteria increase. Sexual activity is the main risk factor for BV. Although BV is not yet considered a sexually transmitted infection, it is more common in people who have multiple sex partners, in people whose sex partner also has BV, and in people who do not use condoms. Other risk factors for BV include douching and cigarette smoking. Options to reduce the risk of BV include regularly using condoms, washing sex toys after every use, and using hormonal contraception (if appropriate).    STD testing also done today. You will receive a call within the next few days with results. If positive for an STD, we will treat accordingly.   Please refrain from having sex until you have your test results. If positive, repeat testing should be done 2-3 weeks after completing treatment to ensure a cure was reached.     Care at home  The most important thing you can do is to wear a condom every time you have sex. Both male and female condoms can protect against STIs. But be aware that male condoms made out of "natural materials," such as sheep intestine, do not protect against STIs.  Do not wear clothes that may hold moisture, such as nylon or polyester. Wear cotton underwear.  Wear loose-fitting pants or other clothes. Avoid wearing underwear while you sleep. This can help keep your vaginal area dry.  Clean your vaginal area with water. If you use soap, be sure it is mild, unscented, and rinse well. Pat the area dry with a clean towel. Do not use bubble baths or douche.  Wipe from front to back after using the toilet.  If you have sex, use latex condoms each time to lower spread of infection. Avoid multiple sex partners.    Please remember that you " have received care at an urgent care today. Urgent cares are not emergency rooms and are not equipped to handle life threatening emergencies and cannot rule in or out certain medical conditions and you may be released before all of your medical problems are known or treated. Please arrange follow up with your primary care physician or speciality clinic (OBGYN)  within 2-5 days if your signs and symptoms have not resolved or worsen. Patient can call our Referral Hotline at [phone] to make an appointment.    Please return here or go to the Emergency Department for any concerns or worsening of condition.Patient was educated on signs/symptoms that would warrant emergent medical attention. Patient verbalized understanding.

## 2024-08-31 LAB
BACTERIAL VAGINOSIS DNA: POSITIVE
CANDIDA GLABRATA DNA: NEGATIVE
CANDIDA KRUSEI DNA: NEGATIVE
CANDIDA RRNA VAG QL PROBE: POSITIVE
T VAGINALIS RRNA GENITAL QL PROBE: NEGATIVE

## 2024-09-02 ENCOUNTER — TELEPHONE (OUTPATIENT)
Dept: URGENT CARE | Facility: CLINIC | Age: 19
End: 2024-09-02
Payer: OTHER GOVERNMENT

## 2024-09-02 DIAGNOSIS — B37.9 YEAST INFECTION: Primary | ICD-10-CM

## 2024-09-02 RX ORDER — FLUCONAZOLE 150 MG/1
150 TABLET ORAL DAILY
Qty: 1 TABLET | Refills: 0 | Status: SHIPPED | OUTPATIENT
Start: 2024-09-02 | End: 2024-09-03

## 2024-09-03 ENCOUNTER — OFFICE VISIT (OUTPATIENT)
Dept: OBSTETRICS AND GYNECOLOGY | Facility: CLINIC | Age: 19
End: 2024-09-03
Payer: OTHER GOVERNMENT

## 2024-09-03 VITALS
BODY MASS INDEX: 27.52 KG/M2 | SYSTOLIC BLOOD PRESSURE: 120 MMHG | WEIGHT: 175.69 LBS | DIASTOLIC BLOOD PRESSURE: 80 MMHG

## 2024-09-03 DIAGNOSIS — R63.5 WEIGHT GAIN: Primary | ICD-10-CM

## 2024-09-03 PROCEDURE — 99999 PR PBB SHADOW E&M-EST. PATIENT-LVL III: CPT | Mod: PBBFAC,,, | Performed by: NURSE PRACTITIONER

## 2024-09-03 PROCEDURE — 99213 OFFICE O/P EST LOW 20 MIN: CPT | Mod: S$PBB,,, | Performed by: NURSE PRACTITIONER

## 2024-09-03 PROCEDURE — 99213 OFFICE O/P EST LOW 20 MIN: CPT | Mod: PBBFAC,PN | Performed by: NURSE PRACTITIONER

## 2024-09-03 RX ORDER — CHLORHEXIDINE GLUCONATE ORAL RINSE 1.2 MG/ML
15 SOLUTION DENTAL 2 TIMES DAILY
COMMUNITY
Start: 2024-08-20

## 2024-09-03 NOTE — PROGRESS NOTES
CC: weight gain     HPI: Pt is a 18 y.o.  female who presents for to discuss her weight gain. She is concerned for possible PCOS. She denies any abnormal hair growth. She uses Nexplanon for contraception. She has been using Nexplanon  since age 14. She moved out and reports rapid weight gain over 60 lbs - with max weight of 190 lbs. She reports with the rapid weight gain she also saw a rapid increase in stretch marks. She started semiglutide and has lost 15 lbs since starting. Reports she eats okay- smaller portions since semiglutide. She is starting to go to the gym- walk and bike. She went to  8/29 and had STD screening.     ROS:  GENERAL: Feeling well overall. Denies fever or chills.   SKIN: Denies rash or lesions.   HEAD: Denies head injury or headache.   NODES: Denies enlarged lymph nodes.   CHEST: Denies chest pain or shortness of breath.   CARDIOVASCULAR: Denies palpitations or left sided chest pain.   ABDOMEN: No abdominal pain, constipation, diarrhea, nausea, vomiting or rectal bleeding.   URINARY: No dysuria, hematuria, or burning on urination.  REPRODUCTIVE: See HPI.   BREASTS: Denies pain, lumps, or nipple discharge.   HEMATOLOGIC: No easy bruisability or excessive bleeding.   MUSCULOSKELETAL: Denies joint pain or swelling.   NEUROLOGIC: Denies syncope or weakness.   PSYCHIATRIC: Denies depression, anxiety or mood swings.    PE:   APPEARANCE: Well nourished, well developed, White female in no acute distress.  PELVIS: Deferred       Diagnosis:  1. Weight gain        Plan:   Discussed weight loss with diet and exercise with emphasis on potion control, good food choices and logging of nutrition  reviewed macronutrient option for nutritional guidance   Discussed pap smear/ CBE will begin at age 20   Discussed unable to fully assess for PCOS with Nexplanon in place  Discussed can look into cosmetic options for striae - suspect they will fade in color over time   Discussed if she feels Nexplnon is  contributing to her weight gain can remove and consider alternative option for contraception     Follow-up PRN no resolution of symptoms.      Claudette Cunningham, KARYP-C

## 2024-09-05 ENCOUNTER — PATIENT MESSAGE (OUTPATIENT)
Dept: URGENT CARE | Facility: CLINIC | Age: 19
End: 2024-09-05
Payer: OTHER GOVERNMENT

## 2024-10-15 ENCOUNTER — OFFICE VISIT (OUTPATIENT)
Dept: URGENT CARE | Facility: CLINIC | Age: 19
End: 2024-10-15
Payer: OTHER GOVERNMENT

## 2024-10-15 VITALS
BODY MASS INDEX: 26.68 KG/M2 | RESPIRATION RATE: 18 BRPM | TEMPERATURE: 98 F | WEIGHT: 170 LBS | OXYGEN SATURATION: 98 % | HEART RATE: 100 BPM | DIASTOLIC BLOOD PRESSURE: 63 MMHG | HEIGHT: 67 IN | SYSTOLIC BLOOD PRESSURE: 104 MMHG

## 2024-10-15 DIAGNOSIS — J02.9 SORE THROAT: ICD-10-CM

## 2024-10-15 DIAGNOSIS — J06.9 VIRAL URI WITH COUGH: Primary | ICD-10-CM

## 2024-10-15 LAB
CTP QC/QA: YES
CTP QC/QA: YES
MOLECULAR STREP A: NEGATIVE
SARS-COV-2 AG RESP QL IA.RAPID: NEGATIVE

## 2024-10-15 PROCEDURE — 99213 OFFICE O/P EST LOW 20 MIN: CPT | Mod: S$GLB,,, | Performed by: NURSE PRACTITIONER

## 2024-10-15 PROCEDURE — 87651 STREP A DNA AMP PROBE: CPT | Mod: QW,S$GLB,, | Performed by: NURSE PRACTITIONER

## 2024-10-15 PROCEDURE — 87811 SARS-COV-2 COVID19 W/OPTIC: CPT | Mod: QW,S$GLB,, | Performed by: NURSE PRACTITIONER

## 2024-10-15 RX ORDER — IPRATROPIUM BROMIDE 21 UG/1
1 SPRAY, METERED NASAL 2 TIMES DAILY
Qty: 30 ML | Refills: 0 | Status: SHIPPED | OUTPATIENT
Start: 2024-10-15 | End: 2024-10-22

## 2024-10-15 NOTE — PROGRESS NOTES
Subjective:      Patient ID: Alia Del Rosario is a 18 y.o. female.    Vitals:  vitals were not taken for this visit.     Chief Complaint: Sinus Problem    This is a 18 y.o. female   who presents today with a chief complaint of     Sinus Problem  ROS   Objective:     Physical Exam    Assessment:     No diagnosis found.    Plan:       There are no diagnoses linked to this encounter.

## 2024-10-15 NOTE — PATIENT INSTRUCTIONS
You may continue to take either Tylenol or Motrin as needed for body aches, chills or fever.    You may continue to take 1 of the over-the-counter products for cough and cold that you have been taking if they have been helpful.  Please be mindful of the fact that these products typically have Tylenol and do not exceed 3000 mg of Tylenol taken every 24 hours.

## 2024-11-26 ENCOUNTER — OFFICE VISIT (OUTPATIENT)
Dept: URGENT CARE | Facility: CLINIC | Age: 19
End: 2024-11-26
Payer: OTHER GOVERNMENT

## 2024-11-26 VITALS
DIASTOLIC BLOOD PRESSURE: 69 MMHG | BODY MASS INDEX: 26.63 KG/M2 | OXYGEN SATURATION: 96 % | RESPIRATION RATE: 20 BRPM | TEMPERATURE: 98 F | HEART RATE: 119 BPM | HEIGHT: 67 IN | SYSTOLIC BLOOD PRESSURE: 95 MMHG

## 2024-11-26 DIAGNOSIS — J06.9 VIRAL URI WITH COUGH: Primary | ICD-10-CM

## 2024-11-26 PROCEDURE — 99213 OFFICE O/P EST LOW 20 MIN: CPT | Mod: S$GLB,,, | Performed by: NURSE PRACTITIONER

## 2024-11-26 RX ORDER — IPRATROPIUM BROMIDE 21 UG/1
1 SPRAY, METERED NASAL 2 TIMES DAILY
Qty: 30 ML | Refills: 0 | Status: SHIPPED | OUTPATIENT
Start: 2024-11-26 | End: 2024-12-03

## 2024-11-26 NOTE — PROGRESS NOTES
"Subjective:      Patient ID: Alia Del Rosario is a 19 y.o. female.    Vitals:  height is 5' 7" (1.702 m). Her oral temperature is 98 °F (36.7 °C). Her blood pressure is 95/69 and her pulse is 119 (abnormal). Her respiration is 20 and oxygen saturation is 96%.     Chief Complaint: Sinus Problem    This is a 19 y.o. female who presents today with a chief complaint of sinus problem. Symptoms started Saturday. Patient expressed that she is having chest congestion and having trouble breathing . Cough is productive. Sputum is green     Provider note begins below:    Pt is an otherwise healthy-appearing adult female in no apparent distress.  She is known to this provider.  Significant history of anxiety.  Vital signs including elevated heart rate typical for this patient per chart check.    Patient comes to the clinic with a 3 day history cough, sinus congestion with postnasal drip and sore throat.  Sick contacts, friends.  No fever or chills.      Sinus Problem  This is a new problem. The current episode started in the past 7 days. The problem is unchanged. There has been no fever. Her pain is at a severity of 0/10. She is experiencing no pain. Associated symptoms include congestion, coughing, a hoarse voice and shortness of breath. Treatments tried: day/ night quil , mcinex, tylenol cold and flu. The treatment provided mild relief.       HENT:  Positive for congestion.    Respiratory:  Positive for cough and shortness of breath.       Objective:     Physical Exam   Constitutional: She is oriented to person, place, and time. She appears well-developed. She is cooperative.  Non-toxic appearance. She does not appear ill. No distress.   HENT:   Head: Normocephalic and atraumatic.   Ears:   Right Ear: Hearing, tympanic membrane, external ear and ear canal normal.   Left Ear: Hearing, tympanic membrane, external ear and ear canal normal.   Nose: Rhinorrhea present. No mucosal edema or nasal deformity. No epistaxis. "   Mouth/Throat: Uvula is midline, oropharynx is clear and moist and mucous membranes are normal. No trismus in the jaw. Normal dentition. No uvula swelling. Cobblestoning present. No oropharyngeal exudate, posterior oropharyngeal edema or posterior oropharyngeal erythema.   Eyes: Conjunctivae and lids are normal. No scleral icterus.   Neck: Trachea normal and phonation normal. Neck supple. No edema present. No erythema present. No neck rigidity present.   Cardiovascular: Normal rate, regular rhythm, normal heart sounds and normal pulses.   Pulmonary/Chest: Effort normal and breath sounds normal. No respiratory distress. She has no decreased breath sounds. She has no wheezes. She has no rhonchi. She has no rales.   Abdominal: Normal appearance.   Musculoskeletal: Normal range of motion.         General: No deformity. Normal range of motion.   Neurological: She is alert and oriented to person, place, and time. She exhibits normal muscle tone. Coordination normal.   Skin: Skin is warm, dry, intact, not diaphoretic and not pale.   Psychiatric: Her speech is normal. Her mood appears anxious.   Nursing note and vitals reviewed.      Assessment:     1. Viral URI with cough        Plan:       Viral URI with cough  -     ipratropium (ATROVENT) 21 mcg (0.03 %) nasal spray; 1 spray by Each Nostril route 2 (two) times daily. for 7 days  Dispense: 30 mL; Refill: 0  -     dextromethorphan HBr 5 mg/5 mL Syrp; Take 15 mLs by mouth every 8 (eight) hours as needed (cough).  Dispense: 354 mL; Refill: 0      Patient Instructions   To make sure that you getting adequate rest.    Please also make sure that you are getting adequate fluids, mostly water.

## 2024-12-06 ENCOUNTER — OFFICE VISIT (OUTPATIENT)
Dept: OBSTETRICS AND GYNECOLOGY | Facility: CLINIC | Age: 19
End: 2024-12-06
Payer: OTHER GOVERNMENT

## 2024-12-06 VITALS
SYSTOLIC BLOOD PRESSURE: 90 MMHG | WEIGHT: 161.63 LBS | BODY MASS INDEX: 25.37 KG/M2 | HEIGHT: 67 IN | DIASTOLIC BLOOD PRESSURE: 66 MMHG

## 2024-12-06 DIAGNOSIS — N92.1 BREAKTHROUGH BLEEDING ON NEXPLANON: ICD-10-CM

## 2024-12-06 DIAGNOSIS — Z97.5 BREAKTHROUGH BLEEDING ON NEXPLANON: ICD-10-CM

## 2024-12-06 DIAGNOSIS — N92.6 IRREGULAR BLEEDING: Primary | ICD-10-CM

## 2024-12-06 LAB
B-HCG UR QL: NEGATIVE
CTP QC/QA: YES

## 2024-12-06 PROCEDURE — 87591 N.GONORRHOEAE DNA AMP PROB: CPT | Performed by: NURSE PRACTITIONER

## 2024-12-06 PROCEDURE — 99999 PR PBB SHADOW E&M-EST. PATIENT-LVL III: CPT | Mod: PBBFAC,,, | Performed by: NURSE PRACTITIONER

## 2024-12-06 PROCEDURE — 99213 OFFICE O/P EST LOW 20 MIN: CPT | Mod: PBBFAC,PN | Performed by: NURSE PRACTITIONER

## 2024-12-06 RX ORDER — ESTRADIOL 2 MG/1
2 TABLET ORAL DAILY
Qty: 10 TABLET | Refills: 3 | Status: SHIPPED | OUTPATIENT
Start: 2024-12-06 | End: 2024-12-16

## 2024-12-06 NOTE — PROGRESS NOTES
CC: Irregular bleeding     HPI: Pt is a 19 y.o.  female who presents c/o irregular bleeding for the past 3 months. She uses Nexplanon for contraception. UPT is negative. She is starting to feel occasional weakness and lightheaded. UPT is negative.   She has lost 30 lbs with weight loss injections.     ROS:  GENERAL: Feeling well overall. Denies fever or chills.   SKIN: Denies rash or lesions.   HEAD: Denies head injury or headache.   NODES: Denies enlarged lymph nodes.   CHEST: Denies chest pain or shortness of breath.   CARDIOVASCULAR: Denies palpitations or left sided chest pain.   ABDOMEN: No abdominal pain, constipation, diarrhea, nausea, vomiting or rectal bleeding.   URINARY: No dysuria, hematuria, or burning on urination.  REPRODUCTIVE: See HPI.   BREASTS: Denies pain, lumps, or nipple discharge.   HEMATOLOGIC: No easy bruisability or excessive bleeding.   MUSCULOSKELETAL: Denies joint pain or swelling.   NEUROLOGIC: Denies syncope or weakness.   PSYCHIATRIC: Denies depression, anxiety or mood swings.    PE:   APPEARANCE: Well nourished, well developed, White female in no acute distress.  VULVA: No lesions. Normal external female genitalia.  URETHRAL MEATUS: Normal size and location, no lesions, no prolapse.  URETHRA: No masses, tenderness, or prolapse.  VAGINA: Moist. No lesions or lacerations noted. No abnormal discharge present. No odor present.   CERVIX: No lesions or discharge. No cervical motion tenderness.   UTERUS: Normal size, regular shape, mobile, non-tender.  ADNEXA: No tenderness. No fullness or masses palpated in the adnexal regions.   ANUS PERINEUM: Normal.      Diagnosis:  1. Irregular bleeding    2. Breakthrough bleeding on Nexplanon        Plan:   UPT is negative  GCCT  Estrace pills sent take 1 tab daily for 10 days to try to offset breakthrough bleeding   Disused to notify clinic if BTB persists and will obtain pelvic US for additional eval     Orders Placed This Encounter    C.  trachomatis/N. gonorrhoeae by AMP DNA    POCT Urine Pregnancy    estradioL (ESTRACE) 2 MG tablet         Follow-up PRN no resolution of symptoms.      YOVANY Mena  \

## 2025-04-08 ENCOUNTER — OFFICE VISIT (OUTPATIENT)
Dept: URGENT CARE | Facility: CLINIC | Age: 20
End: 2025-04-08
Payer: OTHER GOVERNMENT

## 2025-04-08 VITALS
HEART RATE: 74 BPM | BODY MASS INDEX: 22.4 KG/M2 | RESPIRATION RATE: 18 BRPM | WEIGHT: 143 LBS | OXYGEN SATURATION: 97 % | TEMPERATURE: 98 F | DIASTOLIC BLOOD PRESSURE: 74 MMHG | SYSTOLIC BLOOD PRESSURE: 108 MMHG

## 2025-04-08 DIAGNOSIS — J01.90 ACUTE NON-RECURRENT SINUSITIS, UNSPECIFIED LOCATION: Primary | ICD-10-CM

## 2025-04-08 PROCEDURE — 99213 OFFICE O/P EST LOW 20 MIN: CPT | Mod: S$GLB,,,

## 2025-04-08 RX ORDER — HYDROXYZINE HYDROCHLORIDE 25 MG/1
25 TABLET, FILM COATED ORAL
COMMUNITY
Start: 2025-03-20

## 2025-04-08 RX ORDER — FLUTICASONE PROPIONATE 50 MCG
1 SPRAY, SUSPENSION (ML) NASAL DAILY
Qty: 9.9 ML | Refills: 0 | Status: SHIPPED | OUTPATIENT
Start: 2025-04-08 | End: 2025-04-13

## 2025-04-08 RX ORDER — BUPROPION HYDROCHLORIDE 100 MG/1
100 TABLET, EXTENDED RELEASE ORAL EVERY MORNING
COMMUNITY
Start: 2025-03-20

## 2025-04-08 RX ORDER — AMOXICILLIN AND CLAVULANATE POTASSIUM 875; 125 MG/1; MG/1
1 TABLET, FILM COATED ORAL EVERY 12 HOURS
Qty: 14 TABLET | Refills: 0 | Status: SHIPPED | OUTPATIENT
Start: 2025-04-08 | End: 2025-04-15

## 2025-04-08 NOTE — PATIENT INSTRUCTIONS
Sinusitis     If your condition worsens or fails to improve we recommend that you receive another evaluation at the urgent care/ER immediately or contact your PCP to discuss your concerns. You must understand that you've received an urgent care treatment only and that you may be released before all your medical problems are known or treated. You the patient will arrange for followup care as instructed.   Take antibiotics with food and as directed.   If you are female and on BCP and do take the antibiotics, use additional methods to prevent pregnancy while on the antibiotics and for one cycle after.   Flonase (fluticasone) is a nasal spray which is available over the counter and may help with your symptoms   If you just have drainage you can take plain zyrtec, claritin or allegra     Rest and fluids are also important.     Tylenol or ibuprofen can also be used as directed for pain unless you have an allergy to them or medical condition such as stomach ulcers, kidney or liver disease or blood thinners etc for which you should not be taking these type of medications.     If symptoms do not improve in 2-3 days please return for evaluation

## 2025-04-08 NOTE — PROGRESS NOTES
Subjective:      Patient ID: Alia Del Rosario is a 19 y.o. female.    Vitals:  weight is 64.9 kg (143 lb). Her oral temperature is 98.2 °F (36.8 °C). Her blood pressure is 108/74 and her pulse is 74. Her respiration is 18 and oxygen saturation is 97%.     Chief Complaint: Sinus Problem    This is a 19 y.o. female who presents today with a chief complaint of  cough, sinus pressure, face pain that has been going on for over a week. Has been taking dayquil/nyquil for symptom relief.     Sinus Problem  This is a new problem. The current episode started 1 to 4 weeks ago. The problem is unchanged. There has been no fever. Her pain is at a severity of 4/10. The pain is mild. Associated symptoms include congestion, coughing, sinus pressure and a sore throat. Pertinent negatives include no chills, diaphoresis, ear pain, headaches, hoarse voice, neck pain, shortness of breath, sneezing or swollen glands. Past treatments include nothing. The treatment provided no relief.       Constitution: Negative for chills and sweating.   HENT:  Positive for congestion, sinus pressure and sore throat. Negative for ear pain.    Neck: Negative for neck pain.   Cardiovascular: Negative.    Respiratory:  Positive for cough. Negative for shortness of breath.    Gastrointestinal: Negative.    Endocrine: negative.   Genitourinary: Negative.    Musculoskeletal: Negative.    Skin: Negative.    Allergic/Immunologic: Negative for sneezing.   Neurological:  Negative for headaches.   Hematologic/Lymphatic: Negative.    Psychiatric/Behavioral: Negative.        Objective:     Physical Exam   Constitutional: She is oriented to person, place, and time.  Non-toxic appearance. No distress.   HENT:   Head: Normocephalic and atraumatic.   Ears:   Right Ear: Tympanic membrane, external ear and ear canal normal. no impacted cerumen  Left Ear: Tympanic membrane, external ear and ear canal normal. no impacted cerumen  Nose: Sinus tenderness and congestion present.  Right sinus exhibits maxillary sinus tenderness and frontal sinus tenderness. Left sinus exhibits maxillary sinus tenderness and frontal sinus tenderness.   Mouth/Throat: Mucous membranes are moist. No oropharyngeal exudate or posterior oropharyngeal erythema.   Eyes: Conjunctivae are normal.   Cardiovascular: Normal rate, regular rhythm and normal heart sounds.   Pulmonary/Chest: Effort normal and breath sounds normal. No stridor. No respiratory distress. She has no wheezes. She has no rhonchi. She has no rales.   Musculoskeletal: Normal range of motion.         General: Normal range of motion.   Neurological: She is alert and oriented to person, place, and time.   Skin: Skin is warm, dry and not diaphoretic.   Psychiatric: Her behavior is normal. Mood, judgment and thought content normal.       Assessment:     1. Acute non-recurrent sinusitis, unspecified location        Plan:       Acute non-recurrent sinusitis, unspecified location  -     amoxicillin-clavulanate 875-125mg (AUGMENTIN) 875-125 mg per tablet; Take 1 tablet by mouth every 12 (twelve) hours. for 7 days  Dispense: 14 tablet; Refill: 0  -     fluticasone propionate (FLONASE) 50 mcg/actuation nasal spray; 1 spray (50 mcg total) by Each Nostril route once daily. for 5 days  Dispense: 9.9 mL; Refill: 0      Patient Instructions                                                             Sinusitis     If your condition worsens or fails to improve we recommend that you receive another evaluation at the urgent care/ER immediately or contact your PCP to discuss your concerns. You must understand that you've received an urgent care treatment only and that you may be released before all your medical problems are known or treated. You the patient will arrange for followup care as instructed.   Take antibiotics with food and as directed.   If you are female and on BCP and do take the antibiotics, use additional methods to prevent pregnancy while on the  antibiotics and for one cycle after.   Flonase (fluticasone) is a nasal spray which is available over the counter and may help with your symptoms   If you just have drainage you can take plain zyrtec, claritin or allegra     Rest and fluids are also important.     Tylenol or ibuprofen can also be used as directed for pain unless you have an allergy to them or medical condition such as stomach ulcers, kidney or liver disease or blood thinners etc for which you should not be taking these type of medications.     If symptoms do not improve in 2-3 days please return for evaluation

## 2025-07-14 NOTE — PATIENT INSTRUCTIONS
To make sure that you getting adequate rest.    Please also make sure that you are getting adequate fluids, mostly water.   No

## 2025-08-04 ENCOUNTER — TELEPHONE (OUTPATIENT)
Dept: OBSTETRICS AND GYNECOLOGY | Facility: CLINIC | Age: 20
End: 2025-08-04
Payer: OTHER GOVERNMENT

## 2025-08-04 ENCOUNTER — E-CONSULT (OUTPATIENT)
Dept: GASTROENTEROLOGY | Facility: HOSPITAL | Age: 20
End: 2025-08-04
Payer: OTHER GOVERNMENT

## 2025-08-04 ENCOUNTER — OFFICE VISIT (OUTPATIENT)
Dept: INTERNAL MEDICINE | Facility: CLINIC | Age: 20
End: 2025-08-04
Payer: OTHER GOVERNMENT

## 2025-08-04 ENCOUNTER — PATIENT MESSAGE (OUTPATIENT)
Dept: INTERNAL MEDICINE | Facility: CLINIC | Age: 20
End: 2025-08-04

## 2025-08-04 VITALS
HEART RATE: 86 BPM | DIASTOLIC BLOOD PRESSURE: 64 MMHG | WEIGHT: 129.44 LBS | BODY MASS INDEX: 20.31 KG/M2 | RESPIRATION RATE: 18 BRPM | TEMPERATURE: 97 F | HEIGHT: 67 IN | OXYGEN SATURATION: 97 % | SYSTOLIC BLOOD PRESSURE: 92 MMHG

## 2025-08-04 DIAGNOSIS — F33.0 MAJOR DEPRESSIVE DISORDER, RECURRENT, MILD: ICD-10-CM

## 2025-08-04 DIAGNOSIS — Z72.0 VAPES NICOTINE CONTAINING SUBSTANCE: ICD-10-CM

## 2025-08-04 DIAGNOSIS — K59.00 CONSTIPATION, UNSPECIFIED CONSTIPATION TYPE: Primary | ICD-10-CM

## 2025-08-04 DIAGNOSIS — F41.1 GAD (GENERALIZED ANXIETY DISORDER): ICD-10-CM

## 2025-08-04 DIAGNOSIS — K59.04 CHRONIC IDIOPATHIC CONSTIPATION: Primary | ICD-10-CM

## 2025-08-04 DIAGNOSIS — R68.2 DRY MOUTH: ICD-10-CM

## 2025-08-04 DIAGNOSIS — R10.13 EPIGASTRIC PAIN: ICD-10-CM

## 2025-08-04 PROBLEM — F39 MOOD DISORDER: Status: RESOLVED | Noted: 2021-09-09 | Resolved: 2025-08-04

## 2025-08-04 PROBLEM — F32.A DEPRESSION: Status: RESOLVED | Noted: 2020-04-18 | Resolved: 2025-08-04

## 2025-08-04 PROCEDURE — 99214 OFFICE O/P EST MOD 30 MIN: CPT | Mod: PBBFAC,PO | Performed by: NURSE PRACTITIONER

## 2025-08-04 PROCEDURE — 99214 OFFICE O/P EST MOD 30 MIN: CPT | Mod: S$PBB,,, | Performed by: NURSE PRACTITIONER

## 2025-08-04 PROCEDURE — 99451 NTRPROF PH1/NTRNET/EHR 5/>: CPT | Mod: ,,, | Performed by: STUDENT IN AN ORGANIZED HEALTH CARE EDUCATION/TRAINING PROGRAM

## 2025-08-04 PROCEDURE — 99999 PR PBB SHADOW E&M-EST. PATIENT-LVL IV: CPT | Mod: PBBFAC,,, | Performed by: NURSE PRACTITIONER

## 2025-08-04 NOTE — CONSULTS
[facility] GASTROENTEROLOGY  Response for E-Consult     Patient Name: Alia Del Rosario  MRN: [id number]  Primary Care Provider: Temitope Chandra MD   Requesting Provider: Ying Avalos NP  E-Consult to Gastroenterology  Consult performed by: Minna Araya MD  Consult ordered by: Ying Avalos NP      Long standing history of constipation despite trying different laxatives. Most recently on exlax which stopped working. Laxatives don't cause paradoxical constipation but stimulant laxatives can lose efficacy over time. She may also have dyssynergic defecation.    Recommendation: Agree with linzess 72, can escalate the dose based on the response. Agree with ruling out secondary constipation due to thyroid issues or others. If constipation does not respond to max dose miralax may need an anorectal manometry for further evaluation (would need a GI referral).    Total time of Consultation: 5 minute    I did not speak to the requesting provider verbally about this.     *This eConsult is based on the clinical data available to me and is furnished without benefit of a physical examination. The eConsult will need to be interpreted in light of any clinical issues or changes in patient status not available to me at the time of filing this eConsults. Significant changes in patient condition or level of acuity should result in immediate formal consultation and reevaluation. Please alert me if you have further questions.    Thank you for this eConsult referral.     Minna Araya MD  [facility] GASTROENTEROLOGY

## 2025-08-04 NOTE — PROGRESS NOTES
Subjective:       Patient ID: Alia Del Rosario is a 19 y.o. female.    Chief Complaint: Constipation    History of Present Illness    CHIEF COMPLAINT:  Ms. Del Rosario presents today for chronic constipation.    HISTORY OF PRESENT ILLNESS:  She reports chronic constipation since childhood with approximately one hard bowel movement per week. Her last bowel movement was six days ago. She has been taking Ex-lax daily for the past two years with decreasing effectiveness. Previously tried multiple laxatives including Miralax, Dulcolax, and Senna-Debby, with Ex-lax being the most effective. Historically, she would spend hours on the toilet unable to have a bowel movement. She is accustomed to abdominal discomfort associated with constipation and denies nausea or vomiting.  Reports biological mother and sister have chronic constipation as well.   She notes constipation precedes her psychiatric and obesity medication.    DIET AND EXERCISE:  She drinks 3-4 large glasses of water daily and takes a green supplement with 2 g of dietary fiber per serving. She does not consume fruits and vegetables daily and reports no regular exercise. She identifies bread and cheese as foods that worsen her constipation.    GERD:  She experiences acid reflux, predominantly when lying down.    OTHER SYMPTOMS:  She reports experiencing intermittent dry mouth and dry throat, which she believes may be related to medication dosage. The symptoms fluctuate and are potentially medication-dependent.    CURRENT MEDICATIONS:  She continues Lamictal and Prozac since age 14, and currently takes Ozempic injections. She is not currently taking Hydroxyzine but reports intent to start soon for sleep assistance.    FAMILY HISTORY:  Family history is significant for constipation, with her sister and biological mother experiencing similar issues. She denies any known history of bowel diseases such as Crohn's, colitis, or colon cancer.    SOCIAL HISTORY:  She reports  "lifelong history of anxiety and endorses occasional vaping. She denies current cigarette smoking.          Review of patient's allergies indicates:   Allergen Reactions    Apple Shortness Of Breath, Itching and Other (See Comments)     Pt. Had allergy test that reacted with apples.    Cat/feline products Anaphylaxis    Peanut Anaphylaxis    Pear Shortness Of Breath    Tree pollen-sheron Itching       Medication List with Changes/Refills   New Medications    LINACLOTIDE (LINZESS) 72 MCG CAP CAPSULE    Take 1 capsule (72 mcg total) by mouth before breakfast.   Current Medications    HYDROXYZINE HCL (ATARAX) 25 MG TABLET    Take 25 mg by mouth.    LAMOTRIGINE (LAMICTAL) 25 MG TABLET    Take 25 mg by mouth once daily.    PROZAC 40 MG CAPSULE    Take 40 mg by mouth once daily.    SEMAGLUTIDE (OZEMPIC) 0.25 MG OR 0.5 MG (2 MG/3 ML) PEN INJECTOR    Inject 0.5 mg into the skin every 7 days.   Discontinued Medications    BUPROPION (WELLBUTRIN SR) 100 MG TBSR 12 HR TABLET    Take 100 mg by mouth every morning.    CHLORHEXIDINE (PERIDEX) 0.12 % SOLUTION    15 mLs 2 (two) times daily.    ESTRADIOL (ESTRACE) 2 MG TABLET    Take 1 tablet (2 mg total) by mouth once daily. for 10 days     Review of Systems   Constitutional:  Negative for chills and fever.   HENT:          Dry mouth   Respiratory:  Negative for cough and shortness of breath.    Cardiovascular:  Negative for chest pain.   Gastrointestinal:  Positive for abdominal pain, constipation and heartburn. Negative for nausea and vomiting.   Neurological:  Negative for dizziness and headaches.   Psychiatric/Behavioral:  The patient is nervous/anxious.       Objective:   BP 92/64 (BP Location: Left arm, Patient Position: Sitting)   Pulse 86   Temp 97.3 °F (36.3 °C) (Temporal)   Resp 18   Ht 5' 7" (1.702 m)   Wt 58.7 kg (129 lb 6.6 oz)   LMP  (LMP Unknown)   SpO2 97%   BMI 20.27 kg/m²     Physical Exam  Vitals reviewed.   Constitutional:       Appearance: Normal " appearance.   HENT:      Head: Normocephalic and atraumatic.   Cardiovascular:      Rate and Rhythm: Regular rhythm. Tachycardia present.      Heart sounds: Normal heart sounds. No murmur heard.  Pulmonary:      Effort: Pulmonary effort is normal.      Breath sounds: Normal breath sounds. No wheezing.   Abdominal:      General: Bowel sounds are normal.      Palpations: Abdomen is soft.      Tenderness: There is abdominal tenderness in the epigastric area.   Skin:     General: Skin is warm and dry.   Neurological:      Mental Status: She is alert and oriented to person, place, and time.       I reviewed and independently interpreted the labs and imaging below:  Last Labs:  Glucose   Date Value Ref Range Status   06/10/2024 102 70 - 110 mg/dL Final   05/05/2023 59 (L) 70 - 110 mg/dL Final     BUN   Date Value Ref Range Status   06/10/2024 12 6 - 20 mg/dL Final   05/05/2023 8 6 - 20 mg/dL Final     Creatinine   Date Value Ref Range Status   06/10/2024 0.8 0.5 - 1.4 mg/dL Final   05/05/2023 0.8 0.5 - 1.4 mg/dL Final     Cholesterol   Date Value Ref Range Status   06/10/2024 127 120 - 199 mg/dL Final     Comment:     The National Cholesterol Education Program (NCEP) has set the  following guidelines (reference ranges) for Cholesterol:  Optimal.....................<200 mg/dL  Borderline High.............200-239 mg/dL  High........................> or = 240 mg/dL       Hemoglobin A1C   Date Value Ref Range Status   06/10/2024 4.9 4.0 - 5.6 % Final     Comment:     ADA Screening Guidelines:  5.7-6.4%  Consistent with prediabetes  >or=6.5%  Consistent with diabetes    High levels of fetal hemoglobin interfere with the HbA1C  assay. Heterozygous hemoglobin variants (HbS, HgC, etc)do  not significantly interfere with this assay.   However, presence of multiple variants may affect accuracy.       Hemoglobin   Date Value Ref Range Status   06/10/2024 13.5 12.0 - 16.0 g/dL Final   10/10/2023 12.2 12 - 16 g/dL Final   05/05/2023  13.2 12.0 - 16.0 g/dL Final     Hematocrit   Date Value Ref Range Status   06/10/2024 42.0 37.0 - 48.5 % Final   05/05/2023 40.4 37.0 - 48.5 % Final       Assessment and Plan:     Assessment & Plan    Chronic constipation, likely exacerbated by long-term laxative use.  Considered impact of current psychiatric medications on constipation.  Evaluated for potential thyroid dysfunction and autoimmune disorders as contributing factors.  Suspect possible gastritis due to epigastric tenderness and reported acid reflux.    1. Constipation, unspecified constipation type (Primary)    Chronic.   Discussed importance of fiber intake, hydration, and regular exercise for bowel regulation.  Advised on dietary modifications including increased fruits and vegetables, reduced bread consumption, and limiting cheese (especially high-lactose varieties).  Prescribed Linzess at lowest possible dose and discontinued all laxatives, including Ex-lax.  E-Consult to Gastroenterology for further management    - E-Consult to Gastroenterology  - linaCLOtide (LINZESS) 72 mcg Cap capsule; Take 1 capsule (72 mcg total) by mouth before breakfast.  Dispense: 30 capsule; Refill: 1  - TSH; Future  - T4, Free; Future  - Thydoid Peroxidase Antibody; Future  - Tissue Transglutaminase, IgA; Future  - APRIL Screen w/Reflex ([facility] performed); Future    2. Dry mouth    Comes and goes per patient - likely medication side effect.     - APRIL Screen w/Reflex ([facility] performed); Future    3. Epigastric pain    Noted epigastric tenderness, possibly indicating gastritis explaining the reflux symptoms.  Informed patient about common triggers including caffeine, carbonated beverages, citrus, red sauces, and nicotine.  Instructed to monitor diet and avoid trigger foods.    - H. pylori Antibody, IgG; Future    4. Major depressive disorder, recurrent, mild  5. CONRAD (generalized anxiety disorder)    Chronic, depression stable - anxiety not currently well controlled,  continue current medications, follow up with Psychiatry. Encouraged to discuss current symptoms with provider as her medications could be contributing to her constipation/dry mouth.      6. Vapes nicotine containing substance    Chronic. Educated on how nicotine can exacerbate reflux/indigestion symptoms.         This note was generated with the assistance of ambient listening technology. Verbal consent was obtained by the patient and accompanying visitor(s) for the recording of patient appointment to facilitate this note. I attest to having reviewed and edited the generated note for accuracy, though some syntax or spelling errors may persist. Please contact the author of this note for any clarification.

## 2025-08-04 NOTE — TELEPHONE ENCOUNTER
----- Message from Karolyn sent at 8/4/2025 11:40 AM CDT -----  Pt called wanted to speak with someone in the office about coming in sooner for her IUD renewal she can be reached at [phone]

## 2025-08-05 DIAGNOSIS — F19.91 HISTORY OF DRUG USE: Primary | ICD-10-CM

## 2025-08-05 NOTE — PROGRESS NOTES
Received notification from patient psychiatric provider that patient has history of drug abuse. Will add drug screen to upcoming labs.

## 2025-08-06 ENCOUNTER — LAB VISIT (OUTPATIENT)
Dept: LAB | Facility: HOSPITAL | Age: 20
End: 2025-08-06
Payer: OTHER GOVERNMENT

## 2025-08-06 DIAGNOSIS — R68.2 DRY MOUTH: ICD-10-CM

## 2025-08-06 DIAGNOSIS — F19.91 HISTORY OF DRUG USE: ICD-10-CM

## 2025-08-06 DIAGNOSIS — K59.00 CONSTIPATION, UNSPECIFIED CONSTIPATION TYPE: ICD-10-CM

## 2025-08-06 DIAGNOSIS — R10.13 EPIGASTRIC PAIN: ICD-10-CM

## 2025-08-06 LAB
T4 FREE SERPL-MCNC: 0.97 NG/DL (ref 0.71–1.51)
TSH SERPL-ACNC: 1.29 UIU/ML (ref 0.4–4)

## 2025-08-06 PROCEDURE — 36415 COLL VENOUS BLD VENIPUNCTURE: CPT | Mod: PN

## 2025-08-06 PROCEDURE — 86235 NUCLEAR ANTIGEN ANTIBODY: CPT | Mod: 59

## 2025-08-06 PROCEDURE — 86235 NUCLEAR ANTIGEN ANTIBODY: CPT

## 2025-08-06 PROCEDURE — 84439 ASSAY OF FREE THYROXINE: CPT

## 2025-08-06 PROCEDURE — 86364 TISS TRNSGLTMNASE EA IG CLAS: CPT

## 2025-08-06 PROCEDURE — 84443 ASSAY THYROID STIM HORMONE: CPT

## 2025-08-06 PROCEDURE — 86677 HELICOBACTER PYLORI ANTIBODY: CPT

## 2025-08-06 PROCEDURE — 86038 ANTINUCLEAR ANTIBODIES: CPT

## 2025-08-06 PROCEDURE — 86225 DNA ANTIBODY NATIVE: CPT

## 2025-08-06 PROCEDURE — 86376 MICROSOMAL ANTIBODY EACH: CPT

## 2025-08-06 PROCEDURE — 80307 DRUG TEST PRSMV CHEM ANLYZR: CPT

## 2025-08-07 LAB
ANA (OHS): POSITIVE
ANA PATTERN 1 (OHS): ABNORMAL
ANA TITER 1 (OHS): ABNORMAL
H PYLORI IGG SERPL QL IA: NEGATIVE
THYROPEROXIDASE IGG SERPL-ACNC: <6 IU/ML

## 2025-08-08 LAB
DSDNA ANTIBODY (OHS): NORMAL
DSDNA ANTIBODY TITER (OHS): NORMAL
SM/RNP ANTIBODY (OHS): 0.11 RATIO
SM/RNP INTERPRETATION (OHS): NEGATIVE
SSB  ANTIBODY (OHS): 0.19 RATIO
SSB INTERPRETATION (OHS): NEGATIVE

## 2025-08-09 LAB
W AMPHETAMINE, SERUM, QUALITATIVE: NEGATIVE
W BARBITURATE, SERUM, QUALITATIVE: NEGATIVE
W BENZODIAZEPINE, SERUM, QUALITATIVE: NEGATIVE
W COCAINE, SERUM, QUALITATIVE: NEGATIVE
W ETHANOL, SERUM: NEGATIVE
W METHADONE, SERUM, QUALITATIVE: NEGATIVE
W OPIATE, SERUM, QUALITATIVE: NEGATIVE
W PHENCYCLIDINE, SERUM, QUALITATIVE: NEGATIVE
W PROPOXYPHENE, SERUM, QUALITATIVE: NEGATIVE
W THC, SERUM, QUALITATIVE: NEGATIVE

## 2025-08-11 LAB
SM  ANTIBODY (OHS): 0.16 RATIO
SM INTERPRETATION (OHS): NEGATIVE
SSA  ANTIBODY (OHS): 0.08 RATIO (ref 0–0.99)
SSA INTERPRETATION (OHS): NEGATIVE
W TISSUE TRANSGLUTAMINASE IGA AB: 0.3 U/ML

## 2025-08-18 ENCOUNTER — OFFICE VISIT (OUTPATIENT)
Dept: GASTROENTEROLOGY | Facility: CLINIC | Age: 20
End: 2025-08-18
Payer: OTHER GOVERNMENT

## 2025-08-18 DIAGNOSIS — K59.09 CHRONIC CONSTIPATION: Primary | ICD-10-CM

## 2025-08-18 PROCEDURE — 98002 SYNCH AUDIO-VIDEO NEW MOD 45: CPT | Mod: 95,,, | Performed by: STUDENT IN AN ORGANIZED HEALTH CARE EDUCATION/TRAINING PROGRAM

## 2025-08-19 ENCOUNTER — TELEPHONE (OUTPATIENT)
Dept: ENDOSCOPY | Facility: HOSPITAL | Age: 20
End: 2025-08-19
Payer: OTHER GOVERNMENT

## 2025-08-21 ENCOUNTER — PATIENT MESSAGE (OUTPATIENT)
Dept: GASTROENTEROLOGY | Facility: CLINIC | Age: 20
End: 2025-08-21
Payer: OTHER GOVERNMENT

## 2025-08-21 ENCOUNTER — TELEPHONE (OUTPATIENT)
Dept: ENDOSCOPY | Facility: HOSPITAL | Age: 20
End: 2025-08-21
Payer: OTHER GOVERNMENT

## 2025-08-21 DIAGNOSIS — K59.09 CHRONIC CONSTIPATION: ICD-10-CM

## 2025-08-21 DIAGNOSIS — K59.04 CHRONIC IDIOPATHIC CONSTIPATION: Primary | ICD-10-CM

## 2025-08-21 DIAGNOSIS — R10.9 ABDOMINAL CRAMPING: Primary | ICD-10-CM

## 2025-08-21 RX ORDER — DICYCLOMINE HYDROCHLORIDE 10 MG/1
10 CAPSULE ORAL
Qty: 120 CAPSULE | Refills: 0 | Status: SHIPPED | OUTPATIENT
Start: 2025-08-21 | End: 2025-09-20

## 2025-08-22 DIAGNOSIS — R10.9 ABDOMINAL CRAMPING: ICD-10-CM

## 2025-08-22 RX ORDER — DICYCLOMINE HYDROCHLORIDE 10 MG/1
CAPSULE ORAL
Qty: 360 CAPSULE | OUTPATIENT
Start: 2025-08-22

## 2025-08-25 DIAGNOSIS — K59.04 CHRONIC IDIOPATHIC CONSTIPATION: Primary | ICD-10-CM

## 2025-08-25 RX ORDER — PLECANATIDE 3 MG/1
3 TABLET ORAL DAILY
Qty: 30 TABLET | Refills: 11 | Status: SHIPPED | OUTPATIENT
Start: 2025-08-25 | End: 2026-08-20

## 2025-08-29 ENCOUNTER — TELEPHONE (OUTPATIENT)
Dept: ENDOSCOPY | Facility: HOSPITAL | Age: 20
End: 2025-08-29
Payer: OTHER GOVERNMENT

## 2025-08-29 DIAGNOSIS — K62.5 RECTAL BLEED: Primary | ICD-10-CM

## 2025-08-29 DIAGNOSIS — K59.00 CONSTIPATION, UNSPECIFIED CONSTIPATION TYPE: ICD-10-CM

## 2025-08-29 RX ORDER — SODIUM, POTASSIUM,MAG SULFATES 17.5-3.13G
2 SOLUTION, RECONSTITUTED, ORAL ORAL DAILY
Qty: 1 KIT | Refills: 0 | Status: SHIPPED | OUTPATIENT
Start: 2025-09-25 | End: 2025-09-27

## (undated) DEVICE — ELECTRODE REM PLYHSV RETURN 9

## (undated) DEVICE — BLADE RED 40 ADENOID

## (undated) DEVICE — SPONGE TONSIL MEDIUM

## (undated) DEVICE — SEE MEDLINE ITEM 152496

## (undated) DEVICE — SEE MEDLINE ITEM 157117

## (undated) DEVICE — PENCIL ROCKER SWITCH 10FT CORD

## (undated) DEVICE — CATH ALL PUR URTHL RR 10FR

## (undated) DEVICE — PACK TONSIL CUSTOM

## (undated) DEVICE — KIT ANTIFOG

## (undated) DEVICE — SUCTION COAGULATOR 10FR 6IN